# Patient Record
Sex: MALE | Race: WHITE | NOT HISPANIC OR LATINO | Employment: FULL TIME
[De-identification: names, ages, dates, MRNs, and addresses within clinical notes are randomized per-mention and may not be internally consistent; named-entity substitution may affect disease eponyms.]

---

## 2017-03-31 ENCOUNTER — HOSPITAL (OUTPATIENT)
Dept: OTHER | Age: 32
End: 2017-03-31
Attending: INTERNAL MEDICINE

## 2017-03-31 LAB
A/G RATIO_: 0.6
ABS LYMPH: 1.7 K/CUMM (ref 1–3.5)
ABS MONO: 0.6 K/CUMM (ref 0.1–0.8)
ABS NEUTRO: 3.7 K/CUMM (ref 2–8)
ALBUMIN: 2.6 G/DL (ref 3.5–5)
ALCOHOL, ETHYL: <10 MG/DL (ref 0–10)
ALK PHOS: 232 UNIT/L (ref 50–124)
ALT/GPT: 40 UNIT/L (ref 0–55)
ANION GAP SERPL CALC-SCNC: 19 MEQ/L (ref 10–20)
APTT PPP: 28 SECONDS (ref 22–30)
AST/GOT: 186 UNIT/L (ref 5–34)
BASOPHIL: 1 % (ref 0–1)
BILI TOTAL: 21.7 MG/DL (ref 0.2–1)
BUN SERPL-MCNC: 8 MG/DL (ref 6–20)
CALCIUM: 8.4 MG/DL (ref 8.4–10.2)
CHLORIDE: 98 MEQ/L (ref 97–107)
CONTROL NEG: NEGATIVE
CONTROL POS: POSITIVE
CREATININE: 0.6 MG/DL (ref 0.6–1.3)
DIFF_TYPE?: ABNORMAL
EOSINOPHIL: 0 % (ref 0–6)
GLOBULIN_: 4.7 G/DL (ref 2–4.1)
GLUCOSE LVL: 114 MG/DL (ref 70–99)
HCT VFR BLD CALC: 29 % (ref 36–51)
HCT VFR BLD CALC: 35 % (ref 36–51)
HEMOLYSIS 2+: ABNORMAL
HEMOLYSIS 2+: ABNORMAL
HEMOLYSIS 3+: ABNORMAL
HEMOLYSIS 4+: NEGATIVE
HEMOLYSIS 4+: NEGATIVE
HEMOLYSIS 4+: NORMAL
HGB BLD-MCNC: 12 G/DL (ref 12–17)
HGB BLD-MCNC: 9.9 G/DL (ref 12–17)
ICTERIC 4+: ABNORMAL
ICTERIC 4+: NORMAL
IMMATURE GRAN: 0.3 % (ref 0–0.3)
INR: 1.8 (ref 0.9–1.1)
INSTR WBC: 6 K/CUMM (ref 4–11)
LACTIC ACID: 1.2 MMOL/L (ref 0.5–2.2)
LACTIC ACID: 4.2 MMOL/L (ref 0.5–2.2)
LIPASE LEVEL: 18 UNIT/L (ref 8–78)
LIPEMIC 1+: NEGATIVE
LIPEMIC 1+: NEGATIVE
LIPEMIC 3+: NEGATIVE
LIPEMIC 4+: NEGATIVE
LYMPHOCYTE: 28 %
MAGNESIUM LEVEL: 1.2 MG/DL (ref 1.6–2.6)
MCH RBC QN AUTO: 34 PG (ref 25–35)
MCHC RBC AUTO-ENTMCNC: 34 G/DL (ref 32–37)
MCV RBC AUTO: 101 FL (ref 78–97)
MONOCYTE: 10 %
NEUTROPHIL: 61 %
NRBC BLD MANUAL-RTO: 0 % (ref 0–0.2)
OCCULT BLD STL: POSITIVE
PHOSPHORUS: 2.1 MG/DL (ref 2.3–4.7)
PLATELET: 70 K/CUMM (ref 150–450)
POTASSIUM: 4 MEQ/L (ref 3.5–5.1)
PROTHROMBIN TIME: 18.8 SECONDS (ref 9.7–11.6)
RBC # BLD: 3.48 M/CUMM (ref 4.2–6)
RDW: 18.2 % (ref 11.5–14.5)
SODIUM: 136 MEQ/L (ref 136–145)
TCO2: 23 MEQ/L (ref 19–29)
TOTAL PROTEIN: 7.3 G/DL (ref 6.4–8.3)
TROPONIN I: 0.01 NG/ML
WBC # BLD: 6 K/CUMM (ref 4–11)

## 2017-04-01 LAB
A/G RATIO_: 0.6
ABS LYMPH: 1.1 K/CUMM (ref 1–3.5)
ABS MONO: 0.2 K/CUMM (ref 0.1–0.8)
ABS NEUTRO: 1.8 K/CUMM (ref 2–8)
ALBUMIN: 2.2 G/DL (ref 3.5–5)
ALK PHOS: 164 UNIT/L (ref 50–124)
ALT/GPT: 31 UNIT/L (ref 0–55)
AMMONIA LVL: 68 UMOL/L (ref 18–72)
ANION GAP SERPL CALC-SCNC: 17 MEQ/L (ref 10–20)
AST/GOT: 130 UNIT/L (ref 5–34)
BASOPHIL: 1 % (ref 0–1)
BILI TOTAL: 19.9 MG/DL (ref 0.2–1)
BUN SERPL-MCNC: 7 MG/DL (ref 6–20)
CALCIUM: 7.3 MG/DL (ref 8.4–10.2)
CHLORIDE: 108 MEQ/L (ref 97–107)
CREATININE: 0.65 MG/DL (ref 0.6–1.3)
DIFF_TYPE?: ABNORMAL
EOSINOPHIL: 2 % (ref 0–6)
GLOBULIN_: 3.5 G/DL (ref 2–4.1)
GLUCOSE LVL: 73 MG/DL (ref 70–99)
HCT VFR BLD CALC: 29 % (ref 36–51)
HEMOLYSIS 1+: NEGATIVE
HEMOLYSIS 2+: NEGATIVE
HEMOLYSIS 2+: NEGATIVE
HEMOLYSIS 3+: NEGATIVE
HGB BLD-MCNC: 9.7 G/DL (ref 12–17)
IMMATURE GRAN: 0.3 % (ref 0–0.3)
INR: 1.9 (ref 0.9–1.1)
INSTR WBC: 3.2 K/CUMM (ref 4–11)
LIPEMIC 3+: NEGATIVE
LIPEMIC 4+: NEGATIVE
LYMPHOCYTE: 33 %
MAGNESIUM LEVEL: 1.3 MG/DL (ref 1.6–2.6)
MCH RBC QN AUTO: 35 PG (ref 25–35)
MCHC RBC AUTO-ENTMCNC: 33 G/DL (ref 32–37)
MCV RBC AUTO: 104 FL (ref 78–97)
MONOCYTE: 8 %
NEUTROPHIL: 55 %
NRBC BLD MANUAL-RTO: 0 % (ref 0–0.2)
PHOSPHORUS: 3.1 MG/DL (ref 2.3–4.7)
PLATELET: 65 K/CUMM (ref 150–450)
PLT ESTIMATE: ABNORMAL
POTASSIUM: 3.8 MEQ/L (ref 3.5–5.1)
PROTHROMBIN TIME: 19.3 SECONDS (ref 9.7–11.6)
RBC # BLD: 2.79 M/CUMM (ref 4.2–6)
RBC MORPH2: ABNORMAL
RBC MORPH3: ABNORMAL
RBC MORPH: ABNORMAL
RDW: 18.6 % (ref 11.5–14.5)
SODIUM: 141 MEQ/L (ref 136–145)
TCO2: 20 MEQ/L (ref 19–29)
TOTAL PROTEIN: 5.7 G/DL (ref 6.4–8.3)
WBC # BLD: 3.2 K/CUMM (ref 4–11)

## 2017-04-02 LAB
A/G RATIO_: 0.6
ABG GLU: 109 MG/G (ref 65–95)
ABG HCT: 31 % (ref 37–50)
ABG ION CALCIUM: 4.2 MG/DL (ref 4.5–5.3)
ABG K: 3.76 MMOL/L (ref 3.5–5.3)
ABG NA: 134.8 MMOL/L (ref 135–148)
ABS LYMPH: 1 K/CUMM (ref 1–3.5)
ABS MONO: 0.2 K/CUMM (ref 0.1–0.8)
ABS NEUTRO: 1.8 K/CUMM (ref 2–8)
ALBUMIN: 2 G/DL (ref 3.5–5)
ALK PHOS: 153 UNIT/L (ref 50–124)
ALLEN'S TEST: ABNORMAL
ALT/GPT: 30 UNIT/L (ref 0–55)
AMMONIA LVL: 74 UMOL/L (ref 18–72)
AMMONIA LVL: 75 UMOL/L (ref 18–72)
ANALYZER: ABNORMAL
ANION GAP SERPL CALC-SCNC: 16 MEQ/L (ref 10–20)
AST/GOT: 119 UNIT/L (ref 5–34)
BASOPHIL: 1 % (ref 0–1)
BEVT: -4.5 MMOL/L (ref -2–3)
BILI TOTAL: 23.5 MG/DL (ref 0.2–1)
BUN SERPL-MCNC: 11 MG/DL (ref 6–20)
CALCIUM: 7.1 MG/DL (ref 8.4–10.2)
CHLORIDE: 107 MEQ/L (ref 97–107)
COHB: 1.9 % (ref 0.5–1.5)
CREATININE: 0.72 MG/DL (ref 0.6–1.3)
DIFF_TYPE?: ABNORMAL
DRAW SITE: ABNORMAL
EOSINOPHIL: 2 % (ref 0–6)
GLOBULIN_: 3.4 G/DL (ref 2–4.1)
GLUCOSE LVL: 88 MG/DL (ref 70–99)
HCO3: 18.8 MMOL/L (ref 22–26)
HCT VFR BLD CALC: 30 % (ref 36–51)
HEMOLYSIS 1+: NEGATIVE
HEMOLYSIS 1+: NEGATIVE
HEMOLYSIS 2+: NEGATIVE
HEMOLYSIS 2+: NEGATIVE
HEMOLYSIS 3+: NEGATIVE
HGB BLD-MCNC: 9.9 G/DL (ref 12–17)
IMMATURE GRAN: 0.3 % (ref 0–0.3)
INR: 1.9 (ref 0.9–1.1)
INSTR WBC: 3.1 K/CUMM (ref 4–11)
LIPEMIC 3+: NEGATIVE
LIPEMIC 4+: NEGATIVE
LYMPHOCYTE: 30 %
MAGNESIUM LEVEL: 1.4 MG/DL (ref 1.6–2.6)
MCH RBC QN AUTO: 35 PG (ref 25–35)
MCHC RBC AUTO-ENTMCNC: 33 G/DL (ref 32–37)
MCV RBC AUTO: 108 FL (ref 78–97)
METHB: 0.2 % (ref 0–1.5)
MODE: ABNORMAL
MONOCYTE: 8 %
NEUTROPHIL: 58 %
NRBC BLD MANUAL-RTO: 0 % (ref 0–0.2)
O2 SAT(EST): 94.6 %
O2HB: 92.6 % (ref 94–97)
PCO2: 28.9 MMHG (ref 35–45)
PH: 7.43 (ref 7.35–7.45)
PHOSPHORUS: 2.3 MG/DL (ref 2.3–4.7)
PLATELET: 71 K/CUMM (ref 150–450)
PO2: 72.1 MMHG (ref 75–100)
POTASSIUM: 3.8 MEQ/L (ref 3.5–5.1)
PROTHROMBIN TIME: 19.4 SECONDS (ref 9.7–11.6)
RBC # BLD: 2.82 M/CUMM (ref 4.2–6)
RDW: 17.5 % (ref 11.5–14.5)
REPORT STATUS: 2 L/MIN
SAMPLE TYPE: ABNORMAL
SODIUM: 136 MEQ/L (ref 136–145)
TCO2: 17 MEQ/L (ref 19–29)
TECH ID: 197
THB: 10.6 G/DL (ref 12–18)
TOTAL PROTEIN: 5.4 G/DL (ref 6.4–8.3)
WBC # BLD: 3.1 K/CUMM (ref 4–11)

## 2017-04-03 LAB
A/G RATIO_: 0.6
ABG GLU: 118 MG/G (ref 65–95)
ABG GLU: 127 MG/G (ref 65–95)
ABG HCT: 31 % (ref 37–50)
ABG HCT: 32 % (ref 37–50)
ABG ION CALCIUM: 4.1 MG/DL (ref 4.5–5.3)
ABG ION CALCIUM: 4.2 MG/DL (ref 4.5–5.3)
ABG K: 3.54 MMOL/L (ref 3.5–5.3)
ABG K: 3.68 MMOL/L (ref 3.5–5.3)
ABG NA: 133.7 MMOL/L (ref 135–148)
ABG NA: 134.7 MMOL/L (ref 135–148)
ABS LYMPH: 0.8 K/CUMM (ref 1–3.5)
ABS MONO: 0.5 K/CUMM (ref 0.1–0.8)
ABS NEUTRO: 3.3 K/CUMM (ref 2–8)
ALBUMIN: 2 G/DL (ref 3.5–5)
ALK PHOS: 148 UNIT/L (ref 50–124)
ALLEN'S TEST: POSITIVE
ALLEN'S TEST: POSITIVE
ALT/GPT: 32 UNIT/L (ref 0–55)
AMMONIA LVL: 61 UMOL/L (ref 18–72)
ANALYZER: ABNORMAL
ANALYZER: ABNORMAL
ANION GAP SERPL CALC-SCNC: 16 MEQ/L (ref 10–20)
AST/GOT: 119 UNIT/L (ref 5–34)
BASOPHIL: 1 % (ref 0–1)
BEVT: -5.5 MMOL/L (ref -2–3)
BEVT: -5.6 MMOL/L (ref -2–3)
BILI TOTAL: 27 MG/DL (ref 0.2–1)
BUN SERPL-MCNC: 11 MG/DL (ref 6–20)
CALCIUM: 7.3 MG/DL (ref 8.4–10.2)
CHLORIDE: 108 MEQ/L (ref 97–107)
COHB: 1.2 % (ref 0.5–1.5)
COHB: 1.4 % (ref 0.5–1.5)
CREATININE: 0.75 MG/DL (ref 0.6–1.3)
DIFF_TYPE?: ABNORMAL
DRAW SITE: ABNORMAL
DRAW SITE: ABNORMAL
EOSINOPHIL: 1 % (ref 0–6)
GLOBULIN_: 3.3 G/DL (ref 2–4.1)
GLUCOSE LVL: 122 MG/DL (ref 70–99)
HCO3: 18.2 MMOL/L (ref 22–26)
HCO3: 19 MMOL/L (ref 22–26)
HCT VFR BLD CALC: 31 % (ref 36–51)
HEMOLYSIS 1+: NEGATIVE
HEMOLYSIS 2+: NEGATIVE
HEMOLYSIS 2+: NEGATIVE
HEMOLYSIS 3+: NEGATIVE
HGB BLD-MCNC: 10.2 G/DL (ref 12–17)
IMMATURE GRAN: 0.6 % (ref 0–0.3)
INHALED O2 CONCENTRATION: 60 %
INR: 1.7 (ref 0.9–1.1)
INSTR WBC: 4.8 K/CUMM (ref 4–11)
LIPEMIC 3+: NEGATIVE
LIPEMIC 4+: NEGATIVE
LYMPHOCYTE: 18 %
MAGNESIUM LEVEL: 1.5 MG/DL (ref 1.6–2.6)
MCH RBC QN AUTO: 36 PG (ref 25–35)
MCHC RBC AUTO-ENTMCNC: 33 G/DL (ref 32–37)
MCV RBC AUTO: 108 FL (ref 78–97)
METHB: 0.2 % (ref 0–1.5)
METHB: 0.3 % (ref 0–1.5)
MODE: ABNORMAL
MODE: ABNORMAL
MONOCYTE: 10 %
NEUTROPHIL: 70 %
NRBC BLD MANUAL-RTO: 0 % (ref 0–0.2)
O2 SAT(EST): 92.1 %
O2 SAT(EST): 96.6 %
O2HB: 90.6 % (ref 94–97)
O2HB: 95.2 % (ref 94–97)
PATIENT RESP RATE: 20 BR/MIN
PCO2: 29.7 MMHG (ref 35–45)
PCO2: 33.9 MMHG (ref 35–45)
PEEP: 5 CMH20
PH: 7.37 (ref 7.35–7.45)
PH: 7.41 (ref 7.35–7.45)
PHOSPHORUS: 2 MG/DL (ref 2.3–4.7)
PLATELET: 85 K/CUMM (ref 150–450)
PLT ESTIMATE: ABNORMAL
PO2: 65.8 MMHG (ref 75–100)
PO2: 92.7 MMHG (ref 75–100)
POTASSIUM: 3.8 MEQ/L (ref 3.5–5.1)
PROTHROMBIN TIME: 18 SECONDS (ref 9.7–11.6)
RBC # BLD: 2.87 M/CUMM (ref 4.2–6)
RBC MORPH2: ABNORMAL
RBC MORPH3: ABNORMAL
RBC MORPH: ABNORMAL
RDW: 17.6 % (ref 11.5–14.5)
REPORT STATUS: 3 L/MIN
SAMPLE TYPE: ABNORMAL
SAMPLE TYPE: ABNORMAL
SET RESP RATE: 20 BR/MIN
SODIUM: 136 MEQ/L (ref 136–145)
TCO2: 16 MEQ/L (ref 19–29)
TECH ID: 2897
TECH ID: 2897
THB: 10.5 G/DL (ref 12–18)
THB: 10.9 G/DL (ref 12–18)
TIDAL VOLUME: 550 ML
TOTAL PROTEIN: 5.3 G/DL (ref 6.4–8.3)
U AMPH SCRN: NEGATIVE
U BARB SCRN: NEGATIVE
U BENZODIA SCRN: POSITIVE
U COCAINE SCRN: NEGATIVE
U OPIATE SCRN: NEGATIVE
U PCP SCRN: NEGATIVE
U THC SCRN: NEGATIVE
UA APPEAR: ABNORMAL
UA BACTERIA: ABNORMAL
UA BILI: ABNORMAL
UA BLOOD: NEGATIVE
UA COLOR: ABNORMAL
UA EPITHELIAL: ABNORMAL
UA GLUCOSE: NEGATIVE
UA ICTOTEST: ABNORMAL
UA KETONES: ABNORMAL
UA LEUK EST: NEGATIVE
UA NITRITE: NEGATIVE
UA PH: 6 (ref 5–7)
UA PROTEIN: ABNORMAL
UA RBC: ABNORMAL
UA SPEC GRAV: 1.02 (ref 1.01–1.02)
UA UROBILINOGEN: 1 MG/DL (ref 0.2–1)
UA WBC: ABNORMAL
VENTILATOR_MODE: ABNORMAL
WBC # BLD: 4.8 K/CUMM (ref 4–11)
WBC MORPH: ABNORMAL

## 2017-04-04 LAB
A/G RATIO_: 0.5
ABG GLU: 115 MG/G (ref 65–95)
ABG HCT: 36 % (ref 37–50)
ABG ION CALCIUM: 4.3 MG/DL (ref 4.5–5.3)
ABG K: 3.27 MMOL/L (ref 3.5–5.3)
ABG NA: 133.2 MMOL/L (ref 135–148)
ABS LYMPH: 1.7 K/CUMM (ref 1–3.5)
ABS MONO: 1.2 K/CUMM (ref 0.1–0.8)
ABS NEUTRO: 6.4 K/CUMM (ref 2–8)
ALBUMIN: 1.8 G/DL (ref 3.5–5)
ALK PHOS: 131 UNIT/L (ref 50–124)
ALLEN'S TEST: ABNORMAL
ALT/GPT: 32 UNIT/L (ref 0–55)
ANALYZER: ABNORMAL
ANION GAP SERPL CALC-SCNC: 16 MEQ/L (ref 10–20)
AST/GOT: 97 UNIT/L (ref 5–34)
BASOPHIL: 1 % (ref 0–1)
BEVT: -5.6 MMOL/L (ref -2–3)
BILI TOTAL: 24.8 MG/DL (ref 0.2–1)
BUN SERPL-MCNC: 14 MG/DL (ref 6–20)
CALCIUM: 7.1 MG/DL (ref 8.4–10.2)
CHLORIDE: 108 MEQ/L (ref 97–107)
COHB: 0.4 % (ref 0.5–1.5)
CREATININE: 1.11 MG/DL (ref 0.6–1.3)
DIFF_TYPE?: ABNORMAL
DRAW SITE: ABNORMAL
EOSINOPHIL: 1 % (ref 0–6)
GLOBULIN_: 3.3 G/DL (ref 2–4.1)
GLUCOSE LVL: 114 MG/DL (ref 70–99)
HCO3: 17.1 MMOL/L (ref 22–26)
HCT VFR BLD CALC: 34 % (ref 36–51)
HEMOLYSIS 2+: ABNORMAL
HEMOLYSIS 2+: NORMAL
HEMOLYSIS 3+: NORMAL
HGB BLD-MCNC: 11.4 G/DL (ref 12–17)
ICTERIC 4+: NORMAL
IMMATURE GRAN: 1.4 % (ref 0–0.3)
INHALED O2 CONCENTRATION: 60 %
INSTR WBC: 9.7 K/CUMM (ref 4–11)
LIPASE LEVEL: 24 UNIT/L (ref 8–78)
LIPEMIC 3+: NEGATIVE
LIPEMIC 4+: NEGATIVE
LYMPHOCYTE: 18 %
MAGNESIUM LEVEL: 1.9 MG/DL (ref 1.6–2.6)
MCH RBC QN AUTO: 36 PG (ref 25–35)
MCHC RBC AUTO-ENTMCNC: 34 G/DL (ref 32–37)
MCV RBC AUTO: 108 FL (ref 78–97)
METHB: 0 % (ref 0–1.5)
MODE: ABNORMAL
MONOCYTE: 13 %
NEUTROPHIL: 66 %
NRBC BLD MANUAL-RTO: 0 % (ref 0–0.2)
O2 SAT(EST): 98.7 %
O2HB: 98.3 % (ref 94–97)
PATIENT RESP RATE: 20 BR/MIN
PCO2: 26 MMHG (ref 35–45)
PEEP: 5 CMH20
PH: 7.44 (ref 7.35–7.45)
PHOSPHORUS: 2.5 MG/DL (ref 2.3–4.7)
PLATELET: 106 K/CUMM (ref 150–450)
PO2: 148.5 MMHG (ref 75–100)
POTASSIUM: 3.5 MEQ/L (ref 3.5–5.1)
RBC # BLD: 3.14 M/CUMM (ref 4.2–6)
RDW: 18.6 % (ref 11.5–14.5)
SAMPLE TYPE: ABNORMAL
SET RESP RATE: 20 BR/MIN
SODIUM: 136 MEQ/L (ref 136–145)
TCO2: 16 MEQ/L (ref 19–29)
TECH ID: 575
THB: 12.3 G/DL (ref 12–18)
TIDAL VOLUME: 550 ML
TOTAL PROTEIN: 5.1 G/DL (ref 6.4–8.3)
TRIGL SERPL-MCNC: 323 MG/DL
VENTILATOR_MODE: ABNORMAL
WBC # BLD: 9.7 K/CUMM (ref 4–11)

## 2017-04-05 LAB
A/G RATIO_: 0.8
ABG GLU: 143 MG/G (ref 65–95)
ABG HCT: 31 % (ref 37–50)
ABG ION CALCIUM: 4.1 MG/DL (ref 4.5–5.3)
ABG K: 2.95 MMOL/L (ref 3.5–5.3)
ABG NA: 137 MMOL/L (ref 135–148)
ABS LYMPH: 1 K/CUMM (ref 1–3.5)
ABS MONO: 0.6 K/CUMM (ref 0.1–0.8)
ABS NEUTRO: 2.4 K/CUMM (ref 2–8)
ALBUMIN: 2.3 G/DL (ref 3.5–5)
ALK PHOS: 123 UNIT/L (ref 50–124)
ALLEN'S TEST: POSITIVE
ALT/GPT: 27 UNIT/L (ref 0–55)
AMMONIA LVL: 59 UMOL/L (ref 18–72)
ANALYZER: ABNORMAL
ANION GAP SERPL CALC-SCNC: 15 MEQ/L (ref 10–20)
AST/GOT: 68 UNIT/L (ref 5–34)
BASOPHIL: 1 % (ref 0–1)
BEVT: -4.4 MMOL/L (ref -2–3)
BILI TOTAL: 23.5 MG/DL (ref 0.2–1)
BUN SERPL-MCNC: 10 MG/DL (ref 6–20)
CALCIUM: 7.3 MG/DL (ref 8.4–10.2)
CHLORIDE: 107 MEQ/L (ref 97–107)
COHB: 0 % (ref 0.5–1.5)
CREATININE: 0.86 MG/DL (ref 0.6–1.3)
DEVICE SN: ABNORMAL
DIFF_TYPE?: ABNORMAL
DRAW SITE: ABNORMAL
EOSINOPHIL: 1 % (ref 0–6)
GLOBULIN_: 3 G/DL (ref 2–4.1)
GLUCOSE LVL: 133 MG/DL (ref 70–99)
HCO3: 18.4 MMOL/L (ref 22–26)
HCT VFR BLD CALC: 29 % (ref 36–51)
HCT VFR BLD CALC: 30 % (ref 36–51)
HEMOLYSIS 1+: NEGATIVE
HEMOLYSIS 2+: NEGATIVE
HGB BLD-MCNC: 10.1 G/DL (ref 12–17)
HGB BLD-MCNC: 9.9 G/DL (ref 12–17)
IMMATURE GRAN: 2.6 % (ref 0–0.3)
INHALED O2 CONCENTRATION: 40 %
INR: 1.7 (ref 0.9–1.1)
INSTR WBC: 4.2 K/CUMM (ref 4–11)
LIPEMIC 3+: NEGATIVE
LYMPHOCYTE: 24 %
MCH RBC QN AUTO: 36 PG (ref 25–35)
MCHC RBC AUTO-ENTMCNC: 34 G/DL (ref 32–37)
MCV RBC AUTO: 107 FL (ref 78–97)
METHB: 0.3 % (ref 0–1.5)
MODE: ABNORMAL
MONOCYTE: 14 %
NEUTROPHIL: 58 %
NRBC BLD MANUAL-RTO: 0 % (ref 0–0.2)
O2 SAT(EST): 96.2 %
O2HB: 95.9 % (ref 94–97)
PATIENT RESP RATE: 17 BR/MIN
PCO2: 26.7 MMHG (ref 35–45)
PEEP: 5 CMH20
PH: 7.46 (ref 7.35–7.45)
PLATELET: 73 K/CUMM (ref 150–450)
PO2: 89.5 MMHG (ref 75–100)
POC_GLU: 130 MG/DL (ref 70–99)
POC_GLU: 130 MG/DL (ref 70–99)
POC_GLU: 135 MG/DL (ref 70–99)
POC_GLU: 154 MG/DL (ref 70–99)
POTASSIUM: 3 MEQ/L (ref 3.5–5.1)
PROTHROMBIN TIME: 18 SECONDS (ref 9.7–11.6)
RBC # BLD: 2.73 M/CUMM (ref 4.2–6)
RDW: 18.3 % (ref 11.5–14.5)
SAMPLE TYPE: ABNORMAL
SET RESP RATE: 16 BR/MIN
SODIUM: 136 MEQ/L (ref 136–145)
TCO2: 17 MEQ/L (ref 19–29)
TECH ID: 4631
TECH_ID: ABNORMAL
THB: 10.7 G/DL (ref 12–18)
TIDAL VOLUME: 550 ML
TOTAL PROTEIN: 5.3 G/DL (ref 6.4–8.3)
VENTILATOR_MODE: ABNORMAL
WBC # BLD: 4.2 K/CUMM (ref 4–11)

## 2017-04-06 LAB
A/G RATIO_: 0.7
ABG GLU: 128 MG/G (ref 65–95)
ABG HCT: 34 % (ref 37–50)
ABG ION CALCIUM: 4.4 MG/DL (ref 4.5–5.3)
ABG K: 3.11 MMOL/L (ref 3.5–5.3)
ABG NA: 138.5 MMOL/L (ref 135–148)
ABS LYMPH: 1.1 K/CUMM (ref 1–3.5)
ABS MONO: 0.5 K/CUMM (ref 0.1–0.8)
ABS NEUTRO: 2.4 K/CUMM (ref 2–8)
ALBUMIN: 2.2 G/DL (ref 3.5–5)
ALK PHOS: 125 UNIT/L (ref 50–124)
ALLEN'S TEST: POSITIVE
ALT/GPT: 26 UNIT/L (ref 0–55)
ANALYZER: ABNORMAL
ANION GAP SERPL CALC-SCNC: 14 MEQ/L (ref 10–20)
AST/GOT: 63 UNIT/L (ref 5–34)
BASOPHIL: 1 % (ref 0–1)
BEVT: -2.5 MMOL/L (ref -2–3)
BILI TOTAL: 19.1 MG/DL (ref 0.2–1)
BUN SERPL-MCNC: 7 MG/DL (ref 6–20)
CALCIUM: 7.6 MG/DL (ref 8.4–10.2)
CHLORIDE: 111 MEQ/L (ref 97–107)
COHB: 0.7 % (ref 0.5–1.5)
CREATININE: 0.8 MG/DL (ref 0.6–1.3)
DEVICE SN: ABNORMAL
DIFF_TYPE?: ABNORMAL
DRAW SITE: ABNORMAL
EOSINOPHIL: 1 % (ref 0–6)
GLOBULIN_: 3.2 G/DL (ref 2–4.1)
GLUCOSE LVL: 130 MG/DL (ref 70–99)
HCO3: 20.8 MMOL/L (ref 22–26)
HCT VFR BLD CALC: 32 % (ref 36–51)
HEMOLYSIS 2+: NEGATIVE
HGB BLD-MCNC: 10.5 G/DL (ref 12–17)
IMMATURE GRAN: 0.5 % (ref 0–0.3)
INHALED O2 CONCENTRATION: 40 %
INSTR WBC: 4.2 K/CUMM (ref 4–11)
LIPEMIC 3+: NEGATIVE
LYMPHOCYTE: 26 %
MCH RBC QN AUTO: 36 PG (ref 25–35)
MCHC RBC AUTO-ENTMCNC: 33 G/DL (ref 32–37)
MCV RBC AUTO: 107 FL (ref 78–97)
METHB: 0.3 % (ref 0–1.5)
MODE: ABNORMAL
MONOCYTE: 13 %
NEUTROPHIL: 59 %
NRBC BLD MANUAL-RTO: 0 % (ref 0–0.2)
O2 SAT(EST): 94.4 %
O2HB: 93.5 % (ref 94–97)
PATIENT RESP RATE: 20 BR/MIN
PCO2: 31.3 MMHG (ref 35–45)
PEEP: 5 CMH20
PH: 7.44 (ref 7.35–7.45)
PLATELET: 85 K/CUMM (ref 150–450)
PO2: 71.4 MMHG (ref 75–100)
POC_GLU: 130 MG/DL (ref 70–99)
POTASSIUM: 3.2 MEQ/L (ref 3.5–5.1)
RBC # BLD: 2.96 M/CUMM (ref 4.2–6)
RDW: 17.9 % (ref 11.5–14.5)
SAMPLE TYPE: ABNORMAL
SET RESP RATE: 16 BR/MIN
SODIUM: 139 MEQ/L (ref 136–145)
TCO2: 17 MEQ/L (ref 19–29)
TECH ID: 4631
TECH_ID: ABNORMAL
THB: 11.6 G/DL (ref 12–18)
TIDAL VOLUME: 550 ML
TOTAL PROTEIN: 5.4 G/DL (ref 6.4–8.3)
TRIGL SERPL-MCNC: 307 MG/DL
VENTILATOR_MODE: ABNORMAL
WBC # BLD: 4.2 K/CUMM (ref 4–11)

## 2017-04-07 LAB
A/G RATIO_: 0.6
ABG GLU: 138 MG/G (ref 65–95)
ABG HCT: 34 % (ref 37–50)
ABG ION CALCIUM: 4.4 MG/DL (ref 4.5–5.3)
ABG K: 3.16 MMOL/L (ref 3.5–5.3)
ABG NA: 138.7 MMOL/L (ref 135–148)
ABS LYMPH: 1.1 K/CUMM (ref 1–3.5)
ABS MONO: 0.5 K/CUMM (ref 0.1–0.8)
ABS NEUTRO: 2.4 K/CUMM (ref 2–8)
ALBUMIN: 2.1 G/DL (ref 3.5–5)
ALK PHOS: 133 UNIT/L (ref 50–124)
ALLEN'S TEST: ABNORMAL
ALT/GPT: 28 UNIT/L (ref 0–55)
ANALYZER: ABNORMAL
ANION GAP SERPL CALC-SCNC: 13 MEQ/L (ref 10–20)
AST/GOT: 71 UNIT/L (ref 5–34)
BASOPHIL: 1 % (ref 0–1)
BEVT: -1.7 MMOL/L (ref -2–3)
BILI TOTAL: 16.3 MG/DL (ref 0.2–1)
BUN SERPL-MCNC: 8 MG/DL (ref 6–20)
C DIFF COMMENT: ABNORMAL
C DIFF TOXIN PCR: POSITIVE
CALCIUM: 7.8 MG/DL (ref 8.4–10.2)
CHLORIDE: 110 MEQ/L (ref 97–107)
COHB: 0.1 % (ref 0.5–1.5)
CREATININE: 0.73 MG/DL (ref 0.6–1.3)
DIFF_TYPE?: ABNORMAL
DRAW SITE: ABNORMAL
EOSINOPHIL: 1 % (ref 0–6)
GLOBULIN_: 3.7 G/DL (ref 2–4.1)
GLUCOSE LVL: 142 MG/DL (ref 70–99)
GROSS: ABNORMAL
HCO3: 20.4 MMOL/L (ref 22–26)
HCT VFR BLD CALC: 31 % (ref 36–51)
HEMOLYSIS 2+: ABNORMAL
HGB BLD-MCNC: 10.3 G/DL (ref 12–17)
IMMATURE GRAN: 1 % (ref 0–0.3)
INHALED O2 CONCENTRATION: 40 %
INSTR WBC: 4.1 K/CUMM (ref 4–11)
LIPEMIC 3+: NEGATIVE
LYMPHOCYTE: 27 %
MCH RBC QN AUTO: 35 PG (ref 25–35)
MCHC RBC AUTO-ENTMCNC: 33 G/DL (ref 32–37)
MCV RBC AUTO: 106 FL (ref 78–97)
METHB: 0.3 % (ref 0–1.5)
MODE: ABNORMAL
MONOCYTE: 11 %
NEUTROPHIL: 58 %
NRBC BLD MANUAL-RTO: 0 % (ref 0–0.2)
O2 SAT(EST): 94.5 %
O2HB: 94.1 % (ref 94–97)
PATIENT RESP RATE: 20 BR/MIN
PCO2: 27.1 MMHG (ref 35–45)
PEEP: 5 CMH20
PH: 7.5 (ref 7.35–7.45)
PLATELET: 96 K/CUMM (ref 150–450)
PO2: 73.2 MMHG (ref 75–100)
POTASSIUM: 5 MEQ/L (ref 3.5–5.1)
PROCALCITONIN LVL: 0.46
RBC # BLD: 2.92 M/CUMM (ref 4.2–6)
RDW: 17.6 % (ref 11.5–14.5)
SAMPLE TYPE: ABNORMAL
SET RESP RATE: 16 BR/MIN
SODIUM: 137 MEQ/L (ref 136–145)
TCO2: 19 MEQ/L (ref 19–29)
TECH ID: 1079
THB: 11.6 G/DL (ref 12–18)
TIDAL VOLUME: 550 ML
TOTAL PROTEIN: 5.8 G/DL (ref 6.4–8.3)
VENTILATOR_MODE: ABNORMAL
WBC # BLD: 4.1 K/CUMM (ref 4–11)

## 2017-04-08 LAB
ABG GLU: 134 MG/G (ref 65–95)
ABG HCT: 32 % (ref 37–50)
ABG ION CALCIUM: 4.5 MG/DL (ref 4.5–5.3)
ABG K: 3.14 MMOL/L (ref 3.5–5.3)
ABG NA: 139.3 MMOL/L (ref 135–148)
ABS LYMPH: 1.2 K/CUMM (ref 1–3.5)
ABS MONO: 0.6 K/CUMM (ref 0.1–0.8)
ABS NEUTRO: 3.3 K/CUMM (ref 2–8)
ALLEN'S TEST: POSITIVE
AMMONIA LVL: 52 UMOL/L (ref 18–72)
ANALYZER: ABNORMAL
ANION GAP SERPL CALC-SCNC: 12 MEQ/L (ref 10–20)
BASOPHIL: 1 % (ref 0–1)
BEVT: -1.2 MMOL/L (ref -2–3)
BUN SERPL-MCNC: 7 MG/DL (ref 6–20)
CALCIUM: 6.7 MG/DL (ref 8.4–10.2)
CHLORIDE: 116 MEQ/L (ref 97–107)
COHB: 0.7 % (ref 0.5–1.5)
CREATININE: 0.62 MG/DL (ref 0.6–1.3)
DIFF_TYPE?: ABNORMAL
DRAW SITE: ABNORMAL
EOSINOPHIL: 1 % (ref 0–6)
GLUCOSE LVL: 111 MG/DL (ref 70–99)
HCO3: 21.5 MMOL/L (ref 22–26)
HCT VFR BLD CALC: 31 % (ref 36–51)
HEMOLYSIS 1+: NEGATIVE
HEMOLYSIS 2+: NEGATIVE
HEMOLYSIS 3+: NEGATIVE
HGB BLD-MCNC: 9.9 G/DL (ref 12–17)
IMMATURE GRAN: 0.9 % (ref 0–0.3)
INHALED O2 CONCENTRATION: 40 %
INSTR WBC: 5.4 K/CUMM (ref 4–11)
LIPEMIC 4+: NEGATIVE
LYMPHOCYTE: 23 %
MAGNESIUM LEVEL: 1.2 MG/DL (ref 1.6–2.6)
MCH RBC QN AUTO: 34 PG (ref 25–35)
MCHC RBC AUTO-ENTMCNC: 32 G/DL (ref 32–37)
MCV RBC AUTO: 107 FL (ref 78–97)
METHB: 0.3 % (ref 0–1.5)
MODE: ABNORMAL
MONOCYTE: 12 %
NEUTROPHIL: 62 %
NRBC BLD MANUAL-RTO: 0 % (ref 0–0.2)
O2 SAT(EST): 97.1 %
O2HB: 96.1 % (ref 94–97)
PATIENT RESP RATE: 19 BR/MIN
PCO2: 29.5 MMHG (ref 35–45)
PH: 7.48 (ref 7.35–7.45)
PHOSPHORUS: 2.3 MG/DL (ref 2.3–4.7)
PLATELET: 117 K/CUMM (ref 150–450)
PO2: 95.6 MMHG (ref 75–100)
POTASSIUM: 2.6 MEQ/L (ref 3.5–5.1)
POTASSIUM: 3.8 MEQ/L (ref 3.5–5.1)
RBC # BLD: 2.88 M/CUMM (ref 4.2–6)
RDW: 17.4 % (ref 11.5–14.5)
SAMPLE TYPE: ABNORMAL
SET RESP RATE: 12 BR/MIN
SODIUM: 142 MEQ/L (ref 136–145)
TCO2: 17 MEQ/L (ref 19–29)
TECH ID: 2991
THB: 10.9 G/DL (ref 12–18)
TIDAL VOLUME: 550 ML
VENTILATOR_MODE: ABNORMAL
WBC # BLD: 5.4 K/CUMM (ref 4–11)

## 2017-04-09 LAB
A/G RATIO_: 0.5
ABS LYMPH: 1.4 K/CUMM (ref 1–3.5)
ABS MONO: 0.7 K/CUMM (ref 0.1–0.8)
ABS NEUTRO: 4.9 K/CUMM (ref 2–8)
ALBUMIN: 1.9 G/DL (ref 3.5–5)
ALK PHOS: 120 UNIT/L (ref 50–124)
ALT/GPT: 27 UNIT/L (ref 0–55)
ANION GAP SERPL CALC-SCNC: 13 MEQ/L (ref 10–20)
AST/GOT: 64 UNIT/L (ref 5–34)
BASOPHIL: 1 % (ref 0–1)
BILI TOTAL: 15.8 MG/DL (ref 0.2–1)
BUN SERPL-MCNC: 11 MG/DL (ref 6–20)
CALCIUM: 8 MG/DL (ref 8.4–10.2)
CHLORIDE: 112 MEQ/L (ref 97–107)
CREATININE: 0.83 MG/DL (ref 0.6–1.3)
DIFF_TYPE?: ABNORMAL
EOSINOPHIL: 2 % (ref 0–6)
GLOBULIN_: 3.9 G/DL (ref 2–4.1)
GLUCOSE LVL: 174 MG/DL (ref 70–99)
HCT VFR BLD CALC: 33 % (ref 36–51)
HEMOLYSIS 2+: NEGATIVE
HEMOLYSIS 2+: NEGATIVE
HGB BLD-MCNC: 10.7 G/DL (ref 12–17)
IMMATURE GRAN: 1.5 % (ref 0–0.3)
INSTR WBC: 7.3 K/CUMM (ref 4–11)
LIPEMIC 3+: NEGATIVE
LYMPHOCYTE: 19 %
MAGNESIUM LEVEL: 1.9 MG/DL (ref 1.6–2.6)
MCH RBC QN AUTO: 35 PG (ref 25–35)
MCHC RBC AUTO-ENTMCNC: 32 G/DL (ref 32–37)
MCV RBC AUTO: 108 FL (ref 78–97)
MONOCYTE: 9 %
NEUTROPHIL: 67 %
NRBC BLD MANUAL-RTO: 0 % (ref 0–0.2)
PLATELET: 160 K/CUMM (ref 150–450)
POTASSIUM: 3.7 MEQ/L (ref 3.5–5.1)
RBC # BLD: 3.08 M/CUMM (ref 4.2–6)
RDW: 17.2 % (ref 11.5–14.5)
SODIUM: 140 MEQ/L (ref 136–145)
TCO2: 19 MEQ/L (ref 19–29)
TOTAL PROTEIN: 5.8 G/DL (ref 6.4–8.3)
WBC # BLD: 7.3 K/CUMM (ref 4–11)

## 2017-04-10 LAB
A/G RATIO_: 0.5
ABS LYMPH: 1.4 K/CUMM (ref 1–3.5)
ABS MONO: 0.6 K/CUMM (ref 0.1–0.8)
ABS NEUTRO: 4.6 K/CUMM (ref 2–8)
ALBUMIN BF TYPE: NORMAL
ALBUMIN BF: 1 G/DL
ALBUMIN: 1.9 G/DL (ref 3.5–5)
ALK PHOS: 122 UNIT/L (ref 50–124)
ALT/GPT: 26 UNIT/L (ref 0–55)
ANION GAP SERPL CALC-SCNC: 15 MEQ/L (ref 10–20)
APPEAR BF: ABNORMAL
AST/GOT: 73 UNIT/L (ref 5–34)
BASOPHIL: 1 % (ref 0–1)
BF TYPE: ABNORMAL
BILI TOTAL: 13.4 MG/DL (ref 0.2–1)
BUN SERPL-MCNC: 14 MG/DL (ref 6–20)
CALCIUM: 7.8 MG/DL (ref 8.4–10.2)
CHLORIDE: 112 MEQ/L (ref 97–107)
COLOR BF: YELLOW
CREATININE: 0.94 MG/DL (ref 0.6–1.3)
DIFF_TYPE?: ABNORMAL
EOSINOPHIL: 2 % (ref 0–6)
GLOBULIN_: 3.5 G/DL (ref 2–4.1)
GLUC BF TYPE: NORMAL
GLUCOSE BF: 136 MG/DL
GLUCOSE LVL: 146 MG/DL (ref 70–99)
HCT VFR BLD CALC: 30 % (ref 36–51)
HEMOLYSIS 2+: NEGATIVE
HEMOLYSIS 2+: NEGATIVE
HEMOLYSIS 3+: NEGATIVE
HGB BLD-MCNC: 9.6 G/DL (ref 12–17)
IMMATURE GRAN: 0.9 % (ref 0–0.3)
INSTR WBC: 6.9 K/CUMM (ref 4–11)
LDH BF TYPE: NORMAL
LDH BF: 82 UNIT/L
LIPEMIC 3+: NEGATIVE
LIPEMIC 4+: NEGATIVE
LYMPH BF: 31 %
LYMPHOCYTE: 21 %
MACROPHAGE BF: 28 %
MAGNESIUM LEVEL: 1.8 MG/DL (ref 1.6–2.6)
MCH RBC QN AUTO: 34 PG (ref 25–35)
MCHC RBC AUTO-ENTMCNC: 32 G/DL (ref 32–37)
MCV RBC AUTO: 109 FL (ref 78–97)
MESO BF: 30 %
MONOCYTE BF: 2 %
MONOCYTE: 10 %
NEUTROPHIL: 66 %
NRBC BLD MANUAL-RTO: 0 % (ref 0–0.2)
NUCLEATED CELLS: 133 /UL (ref 0–10)
PHOSPHORUS: 2.8 MG/DL (ref 2.3–4.7)
PLATELET: 166 K/CUMM (ref 150–450)
POTASSIUM: 3.9 MEQ/L (ref 3.5–5.1)
PROT BF TYPE: NORMAL
PROTEIN BF: 2.1 G/DL
RBC # BLD: 2.78 M/CUMM (ref 4.2–6)
RBC BF INSTR: 0 /UL
RBC BF: 1000 /UL (ref 0–0)
RDW: 17.2 % (ref 11.5–14.5)
SEGS BF: 9 %
SODIUM: 141 MEQ/L (ref 136–145)
TCO2: 18 MEQ/L (ref 19–29)
TOTAL PROTEIN: 5.4 G/DL (ref 6.4–8.3)
WBC # BLD: 6.9 K/CUMM (ref 4–11)
WBC BF INSTR: 0.13 /UL

## 2017-04-11 LAB
A/G RATIO_: 0.7
ABG GLU: 143 MG/G (ref 65–95)
ABG HCT: 45 % (ref 37–50)
ABG ION CALCIUM: 4.6 MG/DL (ref 4.5–5.3)
ABG K: 3.51 MMOL/L (ref 3.5–5.3)
ABG NA: 141.1 MMOL/L (ref 135–148)
ABS LYMPH: 1 K/CUMM (ref 1–3.5)
ABS MONO: 0.5 K/CUMM (ref 0.1–0.8)
ABS NEUTRO: 5.5 K/CUMM (ref 2–8)
ALBUMIN: 2.2 G/DL (ref 3.5–5)
ALK PHOS: 83 UNIT/L (ref 50–124)
ALLEN'S TEST: POSITIVE
ALT/GPT: 21 UNIT/L (ref 0–55)
AMMONIA LVL: 46 UMOL/L (ref 18–72)
ANALYZER: ABNORMAL
ANION GAP SERPL CALC-SCNC: 15 MEQ/L (ref 10–20)
AST/GOT: 65 UNIT/L (ref 5–34)
BASOPHIL: 1 % (ref 0–1)
BEVT: -1.3 MMOL/L (ref -2–3)
BILI TOTAL: 12.1 MG/DL (ref 0.2–1)
BUN SERPL-MCNC: 13 MG/DL (ref 6–20)
CALCIUM: 7.9 MG/DL (ref 8.4–10.2)
CHLORIDE: 113 MEQ/L (ref 97–107)
COHB: 1 % (ref 0.5–1.5)
CPAP: 5 CMH20
CREATININE: 0.76 MG/DL (ref 0.6–1.3)
DIFF_TYPE?: ABNORMAL
DRAW SITE: ABNORMAL
EOSINOPHIL: 1 % (ref 0–6)
GLOBULIN_: 3 G/DL (ref 2–4.1)
GLUCOSE LVL: 139 MG/DL (ref 70–99)
HCO3: 21.9 MMOL/L (ref 22–26)
HCT VFR BLD CALC: 28 % (ref 36–51)
HEMOLYSIS 1+: NEGATIVE
HEMOLYSIS 2+: NEGATIVE
HGB BLD-MCNC: 8.8 G/DL (ref 12–17)
IMMATURE GRAN: 0.6 % (ref 0–0.3)
INHALED O2 CONCENTRATION: 40 %
INSTR WBC: 7.2 K/CUMM (ref 4–11)
LIPEMIC 3+: NEGATIVE
LYMPHOCYTE: 14 %
MAGNESIUM LEVEL: 1.6 MG/DL (ref 1.6–2.6)
MCH RBC QN AUTO: 35 PG (ref 25–35)
MCHC RBC AUTO-ENTMCNC: 32 G/DL (ref 32–37)
MCV RBC AUTO: 110 FL (ref 78–97)
METHB: 0.3 % (ref 0–1.5)
MODE: ABNORMAL
MONOCYTE: 7 %
NEUTROPHIL: 76 %
NRBC BLD MANUAL-RTO: 0 % (ref 0–0.2)
O2 SAT(EST): 94.3 %
O2HB: 93.1 % (ref 94–97)
PCO2: 32.9 MMHG (ref 35–45)
PH: 7.44 (ref 7.35–7.45)
PLATELET: 136 K/CUMM (ref 150–450)
PO2: 72.2 MMHG (ref 75–100)
POTASSIUM: 3.5 MEQ/L (ref 3.5–5.1)
POTASSIUM: 3.6 MEQ/L (ref 3.5–5.1)
PS: 20 CMH20
RBC # BLD: 2.51 M/CUMM (ref 4.2–6)
RDW: 17.2 % (ref 11.5–14.5)
SAMPLE TYPE: ABNORMAL
SODIUM: 143 MEQ/L (ref 136–145)
TCO2: 19 MEQ/L (ref 19–29)
TECH ID: 6881
THB: 15.3 G/DL (ref 12–18)
TOTAL PROTEIN: 5.2 G/DL (ref 6.4–8.3)
VENTILATOR_MODE: ABNORMAL
WBC # BLD: 7.2 K/CUMM (ref 4–11)

## 2017-04-12 LAB
A/G RATIO_: 0.6
ABG GLU: 127 MG/G (ref 65–95)
ABG HCT: 32 % (ref 37–50)
ABG ION CALCIUM: 4.6 MG/DL (ref 4.5–5.3)
ABG K: 3.76 MMOL/L (ref 3.5–5.3)
ABG NA: 139.3 MMOL/L (ref 135–148)
ABS LYMPH: 1.3 K/CUMM (ref 1–3.5)
ABS MONO: 0.5 K/CUMM (ref 0.1–0.8)
ABS NEUTRO: 5.5 K/CUMM (ref 2–8)
ALBUMIN: 2 G/DL (ref 3.5–5)
ALK PHOS: 93 UNIT/L (ref 50–124)
ALLEN'S TEST: POSITIVE
ALT/GPT: 27 UNIT/L (ref 0–55)
AMMONIA LVL: 45 UMOL/L (ref 18–72)
ANALYZER: ABNORMAL
ANION GAP SERPL CALC-SCNC: 12 MEQ/L (ref 10–20)
AST/GOT: 80 UNIT/L (ref 5–34)
BASOPHIL: 1 % (ref 0–1)
BEVT: -3 MMOL/L (ref -2–3)
BILI TOTAL: 10.8 MG/DL (ref 0.2–1)
BUN SERPL-MCNC: 11 MG/DL (ref 6–20)
CALCIUM: 7.9 MG/DL (ref 8.4–10.2)
CHLORIDE: 115 MEQ/L (ref 97–107)
COHB: 0.4 % (ref 0.5–1.5)
CREATININE: 0.65 MG/DL (ref 0.6–1.3)
DIFF_TYPE?: ABNORMAL
DRAW SITE: ABNORMAL
EOSINOPHIL: 1 % (ref 0–6)
GLOBULIN_: 3.2 G/DL (ref 2–4.1)
GLUCOSE LVL: 123 MG/DL (ref 70–99)
HCO3: 20.1 MMOL/L (ref 22–26)
HCT VFR BLD CALC: 29 % (ref 36–51)
HEMOLYSIS 1+: NORMAL
HEMOLYSIS 2+: ABNORMAL
HEMOLYSIS 2+: NORMAL
HEMOLYSIS 3+: NORMAL
HGB BLD-MCNC: 9.1 G/DL (ref 12–17)
IMMATURE GRAN: 0.7 % (ref 0–0.3)
INHALED O2 CONCENTRATION: 40 %
INSTR WBC: 7.5 K/CUMM (ref 4–11)
LIPEMIC 3+: NEGATIVE
LIPEMIC 4+: NEGATIVE
LYMPHOCYTE: 18 %
MAGNESIUM LEVEL: 1.6 MG/DL (ref 1.6–2.6)
MCH RBC QN AUTO: 35 PG (ref 25–35)
MCHC RBC AUTO-ENTMCNC: 32 G/DL (ref 32–37)
MCV RBC AUTO: 112 FL (ref 78–97)
METHB: 0 % (ref 0–1.5)
MODE: ABNORMAL
MONOCYTE: 6 %
NEUTROPHIL: 73 %
NRBC BLD MANUAL-RTO: 0 % (ref 0–0.2)
O2 SAT(EST): 97.1 %
O2HB: 96.7 % (ref 94–97)
PATIENT RESP RATE: 17 BR/MIN
PCO2: 29.8 MMHG (ref 35–45)
PEEP: 5 CMH20
PH: 7.45 (ref 7.35–7.45)
PHOSPHORUS: 2.8 MG/DL (ref 2.3–4.7)
PLATELET: 118 K/CUMM (ref 150–450)
PO2: 100.4 MMHG (ref 75–100)
POTASSIUM: 4.4 MEQ/L (ref 3.5–5.1)
PS: 20 CMH20
RBC # BLD: 2.57 M/CUMM (ref 4.2–6)
RDW: 17 % (ref 11.5–14.5)
SAMPLE TYPE: ABNORMAL
SODIUM: 142 MEQ/L (ref 136–145)
TCO2: 19 MEQ/L (ref 19–29)
TECH ID: 9545
THB: 10.9 G/DL (ref 12–18)
TOTAL PROTEIN: 5.2 G/DL (ref 6.4–8.3)
VENTILATOR_MODE: ABNORMAL
WBC # BLD: 7.5 K/CUMM (ref 4–11)

## 2017-04-13 LAB
A/G RATIO_: 0.7
ABG GLU: 133 MG/G (ref 65–95)
ABG HCT: 32 % (ref 37–50)
ABG ION CALCIUM: 4.5 MG/DL (ref 4.5–5.3)
ABG K: 3.75 MMOL/L (ref 3.5–5.3)
ABG NA: 140.8 MMOL/L (ref 135–148)
ABS LYMPH: 1.3 K/CUMM (ref 1–3.5)
ABS MONO: 0.4 K/CUMM (ref 0.1–0.8)
ABS NEUTRO: 5.2 K/CUMM (ref 2–8)
ALBUMIN: 2 G/DL (ref 3.5–5)
ALK PHOS: 99 UNIT/L (ref 50–124)
ALLEN'S TEST: POSITIVE
ALT/GPT: 26 UNIT/L (ref 0–55)
ANALYZER: ABNORMAL
ANION GAP SERPL CALC-SCNC: 11 MEQ/L (ref 10–20)
AST/GOT: 83 UNIT/L (ref 5–34)
BASOPHIL: 1 % (ref 0–1)
BEVT: -2 MMOL/L (ref -2–3)
BILI TOTAL: 9.7 MG/DL (ref 0.2–1)
BUN SERPL-MCNC: 13 MG/DL (ref 6–20)
CALCIUM: 7.9 MG/DL (ref 8.4–10.2)
CHLORIDE: 114 MEQ/L (ref 97–107)
COHB: 0.1 % (ref 0.5–1.5)
CREATININE: 0.72 MG/DL (ref 0.6–1.3)
DIFF_TYPE?: ABNORMAL
DRAW SITE: ABNORMAL
EOSINOPHIL: 2 % (ref 0–6)
GLOBULIN_: 3 G/DL (ref 2–4.1)
GLUCOSE LVL: 150 MG/DL (ref 70–99)
HCO3: 20.9 MMOL/L (ref 22–26)
HCT VFR BLD CALC: 27 % (ref 36–51)
HEMOLYSIS 2+: NEGATIVE
HGB BLD-MCNC: 8.6 G/DL (ref 12–17)
IMMATURE GRAN: 0.6 % (ref 0–0.3)
INHALED O2 CONCENTRATION: 35 %
INSTR WBC: 7.1 K/CUMM (ref 4–11)
LIPEMIC 3+: NEGATIVE
LYMPHOCYTE: 19 %
MAGNESIUM LEVEL: 1.5 MG/DL (ref 1.6–2.6)
MCH RBC QN AUTO: 36 PG (ref 25–35)
MCHC RBC AUTO-ENTMCNC: 32 G/DL (ref 32–37)
MCV RBC AUTO: 111 FL (ref 78–97)
METHB: 0.3 % (ref 0–1.5)
MODE: ABNORMAL
MONOCYTE: 5 %
NEUTROPHIL: 73 %
NRBC BLD MANUAL-RTO: 0 % (ref 0–0.2)
O2 SAT(EST): 96.2 %
O2HB: 95.8 % (ref 94–97)
PATIENT RESP RATE: 25 BR/MIN
PCO2: 29.8 MMHG (ref 35–45)
PEEP: 5 CMH20
PH: 7.46 (ref 7.35–7.45)
PLATELET: 118 K/CUMM (ref 150–450)
PO2: 91 MMHG (ref 75–100)
POTASSIUM: 3.4 MEQ/L (ref 3.5–5.1)
POTASSIUM: 3.5 MEQ/L (ref 3.5–5.1)
POTASSIUM: 3.7 MEQ/L (ref 3.5–5.1)
PS: 15 CMH20
RBC # BLD: 2.41 M/CUMM (ref 4.2–6)
RDW: 17.2 % (ref 11.5–14.5)
SAMPLE TYPE: ABNORMAL
SODIUM: 143 MEQ/L (ref 136–145)
TCO2: 21 MEQ/L (ref 19–29)
TECH ID: 9545
THB: 10.9 G/DL (ref 12–18)
TOTAL PROTEIN: 5 G/DL (ref 6.4–8.3)
VENTILATOR_MODE: ABNORMAL
WBC # BLD: 7.1 K/CUMM (ref 4–11)

## 2017-04-14 LAB
A/G RATIO_: 0.6
ABG GLU: 153 MG/G (ref 65–95)
ABG HCT: 32 % (ref 37–50)
ABG ION CALCIUM: 4.4 MG/DL (ref 4.5–5.3)
ABG K: 3.29 MMOL/L (ref 3.5–5.3)
ABG NA: 142.2 MMOL/L (ref 135–148)
ABS LYMPH: 1.3 K/CUMM (ref 1–3.5)
ABS MONO: 0.5 K/CUMM (ref 0.1–0.8)
ABS NEUTRO: 8.4 K/CUMM (ref 2–8)
ALBUMIN: 2.2 G/DL (ref 3.5–5)
ALK PHOS: 115 UNIT/L (ref 50–124)
ALLEN'S TEST: POSITIVE
ALT/GPT: 33 UNIT/L (ref 0–55)
ANALYZER: ABNORMAL
ANION GAP SERPL CALC-SCNC: 16 MEQ/L (ref 10–20)
AST/GOT: 106 UNIT/L (ref 5–34)
BASOPHIL: 1 % (ref 0–1)
BEVT: 0.8 MMOL/L (ref -2–3)
BILI TOTAL: 10.2 MG/DL (ref 0.2–1)
BUN SERPL-MCNC: 15 MG/DL (ref 6–20)
CALCIUM: 8.1 MG/DL (ref 8.4–10.2)
CHLORIDE: 111 MEQ/L (ref 97–107)
COHB: 0.3 % (ref 0.5–1.5)
CREATININE: 0.94 MG/DL (ref 0.6–1.3)
DIFF_TYPE?: ABNORMAL
DRAW SITE: ABNORMAL
EOSINOPHIL: 0 % (ref 0–6)
GLOBULIN_: 3.5 G/DL (ref 2–4.1)
GLUCOSE LVL: 134 MG/DL (ref 70–99)
HCO3: 22.9 MMOL/L (ref 22–26)
HCT VFR BLD CALC: 32 % (ref 36–51)
HEMOLYSIS 2+: NEGATIVE
HEMOLYSIS 2+: NORMAL
HGB BLD-MCNC: 10.3 G/DL (ref 12–17)
IMMATURE GRAN: 0.6 % (ref 0–0.3)
INHALED O2 CONCENTRATION: 35 %
INSTR WBC: 10.4 K/CUMM (ref 4–11)
LIPEMIC 3+: NEGATIVE
LYMPHOCYTE: 13 %
MAGNESIUM LEVEL: 1.5 MG/DL (ref 1.6–2.6)
MAGNESIUM LEVEL: 1.7 MG/DL (ref 1.6–2.6)
MCH RBC QN AUTO: 35 PG (ref 25–35)
MCHC RBC AUTO-ENTMCNC: 32 G/DL (ref 32–37)
MCV RBC AUTO: 110 FL (ref 78–97)
METHB: 0.2 % (ref 0–1.5)
MODE: ABNORMAL
MONOCYTE: 5 %
NEUTROPHIL: 81 %
NRBC BLD MANUAL-RTO: 0 % (ref 0–0.2)
O2 SAT(EST): 97.2 %
O2HB: 96.7 % (ref 94–97)
PATIENT RESP RATE: 27 BR/MIN
PCO2: 28.4 MMHG (ref 35–45)
PEEP: 5 CMH20
PH: 7.52 (ref 7.35–7.45)
PLATELET: 145 K/CUMM (ref 150–450)
PO2: 92.6 MMHG (ref 75–100)
POTASSIUM: 3.2 MEQ/L (ref 3.5–5.1)
POTASSIUM: 3.5 MEQ/L (ref 3.5–5.1)
POTASSIUM: 4.2 MEQ/L (ref 3.5–5.1)
PS: 15 CMH20
RBC # BLD: 2.94 M/CUMM (ref 4.2–6)
RDW: 17.6 % (ref 11.5–14.5)
SAMPLE TYPE: ABNORMAL
SODIUM: 143 MEQ/L (ref 136–145)
TCO2: 20 MEQ/L (ref 19–29)
TECH ID: 2897
THB: 10.8 G/DL (ref 12–18)
TOTAL PROTEIN: 5.7 G/DL (ref 6.4–8.3)
VENTILATOR_MODE: ABNORMAL
WBC # BLD: 10.4 K/CUMM (ref 4–11)

## 2017-04-15 LAB
A/G RATIO_: 0.7
ALBUMIN: 2.2 G/DL (ref 3.5–5)
ALK PHOS: 96 UNIT/L (ref 50–124)
ALT/GPT: 30 UNIT/L (ref 0–55)
AMMONIA LVL: 38 UMOL/L (ref 18–72)
ANION GAP SERPL CALC-SCNC: 13 MEQ/L (ref 10–20)
AST/GOT: 88 UNIT/L (ref 5–34)
BILI TOTAL: 8.7 MG/DL (ref 0.2–1)
BUN SERPL-MCNC: 10 MG/DL (ref 6–20)
CALCIUM: 7.9 MG/DL (ref 8.4–10.2)
CHLORIDE: 114 MEQ/L (ref 97–107)
CREATININE: 0.7 MG/DL (ref 0.6–1.3)
GLOBULIN_: 3.1 G/DL (ref 2–4.1)
GLUCOSE LVL: 134 MG/DL (ref 70–99)
HEMOLYSIS 1+: NEGATIVE
HEMOLYSIS 2+: NEGATIVE
HEMOLYSIS 2+: NORMAL
INR: 1.6 (ref 0.9–1.1)
LIPEMIC 3+: NEGATIVE
MAGNESIUM LEVEL: 1.9 MG/DL (ref 1.6–2.6)
POTASSIUM: 3.4 MEQ/L (ref 3.5–5.1)
PROTHROMBIN TIME: 16.4 SECONDS (ref 9.7–11.6)
SODIUM: 143 MEQ/L (ref 136–145)
TCO2: 19 MEQ/L (ref 19–29)
TOTAL PROTEIN: 5.3 G/DL (ref 6.4–8.3)

## 2017-04-16 LAB
A/G RATIO_: 0.7
ABS LYMPH: 1.1 K/CUMM (ref 1–3.5)
ABS MONO: 0.4 K/CUMM (ref 0.1–0.8)
ABS NEUTRO: 5.1 K/CUMM (ref 2–8)
ALBUMIN: 2.3 G/DL (ref 3.5–5)
ALK PHOS: 89 UNIT/L (ref 50–124)
ALT/GPT: 35 UNIT/L (ref 0–55)
ANION GAP SERPL CALC-SCNC: 13 MEQ/L (ref 10–20)
AST/GOT: 86 UNIT/L (ref 5–34)
BASOPHIL: 1 % (ref 0–1)
BILI TOTAL: 9.3 MG/DL (ref 0.2–1)
BUN SERPL-MCNC: 8 MG/DL (ref 6–20)
CALCIUM: 8.2 MG/DL (ref 8.4–10.2)
CHLORIDE: 111 MEQ/L (ref 97–107)
CREATININE: 0.67 MG/DL (ref 0.6–1.3)
DIFF_TYPE?: ABNORMAL
EOSINOPHIL: 2 % (ref 0–6)
GLOBULIN_: 3.4 G/DL (ref 2–4.1)
GLUCOSE LVL: 107 MG/DL (ref 70–99)
HCT VFR BLD CALC: 30 % (ref 36–51)
HEMOLYSIS 2+: ABNORMAL
HEMOLYSIS 2+: NEGATIVE
HGB BLD-MCNC: 9.4 G/DL (ref 12–17)
IMMATURE GRAN: 0.4 % (ref 0–0.3)
INSTR WBC: 6.7 K/CUMM (ref 4–11)
LIPEMIC 3+: NEGATIVE
LYMPHOCYTE: 16 %
MAGNESIUM LEVEL: 1.4 MG/DL (ref 1.6–2.6)
MCH RBC QN AUTO: 34 PG (ref 25–35)
MCHC RBC AUTO-ENTMCNC: 31 G/DL (ref 32–37)
MCV RBC AUTO: 110 FL (ref 78–97)
MONOCYTE: 6 %
NEUTROPHIL: 76 %
NRBC BLD MANUAL-RTO: 0 % (ref 0–0.2)
PLATELET: 103 K/CUMM (ref 150–450)
POTASSIUM: 4.2 MEQ/L (ref 3.5–5.1)
RBC # BLD: 2.73 M/CUMM (ref 4.2–6)
RDW: 16.4 % (ref 11.5–14.5)
SODIUM: 144 MEQ/L (ref 136–145)
TCO2: 24 MEQ/L (ref 19–29)
TOTAL PROTEIN: 5.7 G/DL (ref 6.4–8.3)
VANCO TR: 13.4 UG/ML (ref 5–15)
WBC # BLD: 6.7 K/CUMM (ref 4–11)

## 2017-04-17 LAB
A/G RATIO_: 0.7
ABS LYMPH: 1 K/CUMM (ref 1–3.5)
ABS MONO: 0.4 K/CUMM (ref 0.1–0.8)
ABS NEUTRO: 4.6 K/CUMM (ref 2–8)
ALBUMIN: 2.3 G/DL (ref 3.5–5)
ALK PHOS: 102 UNIT/L (ref 50–124)
ALT/GPT: 31 UNIT/L (ref 0–55)
ANION GAP SERPL CALC-SCNC: 12 MEQ/L (ref 10–20)
AST/GOT: 82 UNIT/L (ref 5–34)
BASOPHIL: 1 % (ref 0–1)
BILI TOTAL: 7.6 MG/DL (ref 0.2–1)
BUN SERPL-MCNC: 12 MG/DL (ref 6–20)
CALCIUM: 8.6 MG/DL (ref 8.4–10.2)
CHLORIDE: 111 MEQ/L (ref 97–107)
CREATININE: 0.74 MG/DL (ref 0.6–1.3)
DIFF_TYPE?: ABNORMAL
EOSINOPHIL: 2 % (ref 0–6)
GLOBULIN_: 3.5 G/DL (ref 2–4.1)
GLUCOSE LVL: 129 MG/DL (ref 70–99)
HCT VFR BLD CALC: 30 % (ref 36–51)
HEMOLYSIS 2+: NEGATIVE
HGB BLD-MCNC: 9.3 G/DL (ref 12–17)
IMMATURE GRAN: 0.3 % (ref 0–0.3)
INSTR WBC: 6.2 K/CUMM (ref 4–11)
LIPEMIC 3+: NEGATIVE
LYMPHOCYTE: 16 %
MAGNESIUM LEVEL: 1.5 MG/DL (ref 1.6–2.6)
MCH RBC QN AUTO: 34 PG (ref 25–35)
MCHC RBC AUTO-ENTMCNC: 31 G/DL (ref 32–37)
MCV RBC AUTO: 110 FL (ref 78–97)
MONOCYTE: 6 %
NEUTROPHIL: 74 %
NRBC BLD MANUAL-RTO: 0 % (ref 0–0.2)
PLATELET: 118 K/CUMM (ref 150–450)
POTASSIUM: 3.6 MEQ/L (ref 3.5–5.1)
POTASSIUM: 3.6 MEQ/L (ref 3.5–5.1)
RBC # BLD: 2.7 M/CUMM (ref 4.2–6)
RDW: 16 % (ref 11.5–14.5)
SODIUM: 145 MEQ/L (ref 136–145)
TCO2: 26 MEQ/L (ref 19–29)
TOTAL PROTEIN: 5.8 G/DL (ref 6.4–8.3)
WBC # BLD: 6.2 K/CUMM (ref 4–11)

## 2017-04-18 LAB
ABS LYMPH: 1.3 K/CUMM (ref 1–3.5)
ABS MONO: 0.4 K/CUMM (ref 0.1–0.8)
ABS NEUTRO: 4.3 K/CUMM (ref 2–8)
ANION GAP SERPL CALC-SCNC: 13 MEQ/L (ref 10–20)
BASOPHIL: 1 % (ref 0–1)
BUN SERPL-MCNC: 13 MG/DL (ref 6–20)
CALCIUM: 8.4 MG/DL (ref 8.4–10.2)
CHLORIDE: 110 MEQ/L (ref 97–107)
CREATININE: 0.74 MG/DL (ref 0.6–1.3)
DIFF_TYPE?: ABNORMAL
EOSINOPHIL: 2 % (ref 0–6)
GLUCOSE LVL: 132 MG/DL (ref 70–99)
HCT VFR BLD CALC: 31 % (ref 36–51)
HEMOLYSIS 2+: NEGATIVE
HEMOLYSIS 2+: NEGATIVE
HGB BLD-MCNC: 9.8 G/DL (ref 12–17)
IMMATURE GRAN: 0.3 % (ref 0–0.3)
INSTR WBC: 6.2 K/CUMM (ref 4–11)
LYMPHOCYTE: 21 %
MAGNESIUM LEVEL: 1.5 MG/DL (ref 1.6–2.6)
MCH RBC QN AUTO: 34 PG (ref 25–35)
MCHC RBC AUTO-ENTMCNC: 31 G/DL (ref 32–37)
MCV RBC AUTO: 110 FL (ref 78–97)
MONOCYTE: 6 %
NEUTROPHIL: 69 %
NRBC BLD MANUAL-RTO: 0 % (ref 0–0.2)
PLATELET: 135 K/CUMM (ref 150–450)
POTASSIUM: 3.5 MEQ/L (ref 3.5–5.1)
RBC # BLD: 2.85 M/CUMM (ref 4.2–6)
RDW: 15.6 % (ref 11.5–14.5)
SODIUM: 145 MEQ/L (ref 136–145)
TCO2: 26 MEQ/L (ref 19–29)
WBC # BLD: 6.2 K/CUMM (ref 4–11)

## 2017-04-19 LAB
ABS NEUTRO: 3.9 K/CUMM (ref 2–8)
ANION GAP SERPL CALC-SCNC: 13 MEQ/L (ref 10–20)
BUN SERPL-MCNC: 12 MG/DL (ref 6–20)
CALCIUM: 8.3 MG/DL (ref 8.4–10.2)
CHLORIDE: 109 MEQ/L (ref 97–107)
CREATININE: 0.72 MG/DL (ref 0.6–1.3)
GLUCOSE LVL: 125 MG/DL (ref 70–99)
HCT VFR BLD CALC: 31 % (ref 36–51)
HEMOLYSIS 2+: NEGATIVE
HEMOLYSIS 2+: NEGATIVE
HGB BLD-MCNC: 10 G/DL (ref 12–17)
INSTR WBC: 5.4 K/CUMM (ref 4–11)
MAGNESIUM LEVEL: 1.6 MG/DL (ref 1.6–2.6)
MCH RBC QN AUTO: 35 PG (ref 25–35)
MCHC RBC AUTO-ENTMCNC: 32 G/DL (ref 32–37)
MCV RBC AUTO: 108 FL (ref 78–97)
NRBC BLD MANUAL-RTO: 0 % (ref 0–0.2)
PLATELET: 120 K/CUMM (ref 150–450)
POTASSIUM: 3.4 MEQ/L (ref 3.5–5.1)
RBC # BLD: 2.87 M/CUMM (ref 4.2–6)
RDW: 15.7 % (ref 11.5–14.5)
SODIUM: 143 MEQ/L (ref 136–145)
TCO2: 24 MEQ/L (ref 19–29)
WBC # BLD: 5.4 K/CUMM (ref 4–11)

## 2017-04-20 LAB
A/G RATIO_: 0.6
ALBUMIN: 2.4 G/DL (ref 3.5–5)
ALK PHOS: 112 UNIT/L (ref 50–124)
ALT/GPT: 30 UNIT/L (ref 0–55)
ANION GAP SERPL CALC-SCNC: 12 MEQ/L (ref 10–20)
AST/GOT: 86 UNIT/L (ref 5–34)
BILI TOTAL: 6.3 MG/DL (ref 0.2–1)
BUN SERPL-MCNC: 12 MG/DL (ref 6–20)
CALCIUM: 8.5 MG/DL (ref 8.4–10.2)
CHLORIDE: 112 MEQ/L (ref 97–107)
CREATININE: 0.76 MG/DL (ref 0.6–1.3)
GLOBULIN_: 3.9 G/DL (ref 2–4.1)
GLUCOSE LVL: 125 MG/DL (ref 70–99)
HEMOLYSIS 2+: NEGATIVE
LIPEMIC 3+: NEGATIVE
POTASSIUM: 4 MEQ/L (ref 3.5–5.1)
SODIUM: 146 MEQ/L (ref 136–145)
TCO2: 26 MEQ/L (ref 19–29)
TOTAL PROTEIN: 6.3 G/DL (ref 6.4–8.3)

## 2017-04-22 LAB
ABS NEUTRO: 3 K/CUMM (ref 2–8)
ANION GAP SERPL CALC-SCNC: 15 MEQ/L (ref 10–20)
BUN SERPL-MCNC: 13 MG/DL (ref 6–20)
CALCIUM: 8.6 MG/DL (ref 8.4–10.2)
CHLORIDE: 108 MEQ/L (ref 97–107)
CREATININE: 0.72 MG/DL (ref 0.6–1.3)
GLUCOSE LVL: 125 MG/DL (ref 70–99)
HCT VFR BLD CALC: 32 % (ref 36–51)
HEMOLYSIS 2+: NEGATIVE
HGB BLD-MCNC: 10.1 G/DL (ref 12–17)
INSTR WBC: 4.7 K/CUMM (ref 4–11)
MCH RBC QN AUTO: 34 PG (ref 25–35)
MCHC RBC AUTO-ENTMCNC: 31 G/DL (ref 32–37)
MCV RBC AUTO: 108 FL (ref 78–97)
NRBC BLD MANUAL-RTO: 0 % (ref 0–0.2)
PLATELET: 130 K/CUMM (ref 150–450)
POTASSIUM: 3.6 MEQ/L (ref 3.5–5.1)
RBC # BLD: 2.99 M/CUMM (ref 4.2–6)
RDW: 15 % (ref 11.5–14.5)
SODIUM: 142 MEQ/L (ref 136–145)
TCO2: 23 MEQ/L (ref 19–29)
WBC # BLD: 4.7 K/CUMM (ref 4–11)

## 2017-04-24 LAB
A/G RATIO_: 0.6
ABS LYMPH: 1 K/CUMM (ref 1–3.5)
ABS MONO: 0.4 K/CUMM (ref 0.1–0.8)
ABS NEUTRO: 2.9 K/CUMM (ref 2–8)
ALBUMIN: 2.4 G/DL (ref 3.5–5)
ALK PHOS: 116 UNIT/L (ref 50–124)
ALT/GPT: 29 UNIT/L (ref 0–55)
ANION GAP SERPL CALC-SCNC: 14 MEQ/L (ref 10–20)
AST/GOT: 83 UNIT/L (ref 5–34)
BASOPHIL: 1 % (ref 0–1)
BILI TOTAL: 4.8 MG/DL (ref 0.2–1)
BUN SERPL-MCNC: 11 MG/DL (ref 6–20)
CALCIUM: 8.4 MG/DL (ref 8.4–10.2)
CHLORIDE: 105 MEQ/L (ref 97–107)
CREATININE: 0.77 MG/DL (ref 0.6–1.3)
DIFF_TYPE?: ABNORMAL
EOSINOPHIL: 4 % (ref 0–6)
GLOBULIN_: 3.8 G/DL (ref 2–4.1)
GLUCOSE LVL: 115 MG/DL (ref 70–99)
HCT VFR BLD CALC: 31 % (ref 36–51)
HEMOLYSIS 2+: NEGATIVE
HEMOLYSIS 2+: NEGATIVE
HGB BLD-MCNC: 10 G/DL (ref 12–17)
IMMATURE GRAN: 0.2 % (ref 0–0.3)
INSTR WBC: 4.5 K/CUMM (ref 4–11)
LIPEMIC 3+: NEGATIVE
LYMPHOCYTE: 23 %
MAGNESIUM LEVEL: 1.6 MG/DL (ref 1.6–2.6)
MCH RBC QN AUTO: 34 PG (ref 25–35)
MCHC RBC AUTO-ENTMCNC: 32 G/DL (ref 32–37)
MCV RBC AUTO: 105 FL (ref 78–97)
MONOCYTE: 8 %
NEUTROPHIL: 63 %
NRBC BLD MANUAL-RTO: 0 % (ref 0–0.2)
PLATELET: 127 K/CUMM (ref 150–450)
POTASSIUM: 3.6 MEQ/L (ref 3.5–5.1)
RBC # BLD: 2.96 M/CUMM (ref 4.2–6)
RDW: 14.8 % (ref 11.5–14.5)
SODIUM: 141 MEQ/L (ref 136–145)
TCO2: 26 MEQ/L (ref 19–29)
TOTAL PROTEIN: 6.2 G/DL (ref 6.4–8.3)
WBC # BLD: 4.5 K/CUMM (ref 4–11)

## 2017-04-26 LAB
ABS NEUTRO: 1.9 K/CUMM (ref 2–8)
ANION GAP SERPL CALC-SCNC: 15 MEQ/L (ref 10–20)
BUN SERPL-MCNC: 10 MG/DL (ref 6–20)
CALCIUM: 8.6 MG/DL (ref 8.4–10.2)
CHLORIDE: 104 MEQ/L (ref 97–107)
CREATININE: 0.77 MG/DL (ref 0.6–1.3)
GLUCOSE LVL: 86 MG/DL (ref 70–99)
HCT VFR BLD CALC: 31 % (ref 36–51)
HEMOLYSIS 2+: NEGATIVE
HGB BLD-MCNC: 9.9 G/DL (ref 12–17)
INSTR WBC: 3.7 K/CUMM (ref 4–11)
MCH RBC QN AUTO: 33 PG (ref 25–35)
MCHC RBC AUTO-ENTMCNC: 32 G/DL (ref 32–37)
MCV RBC AUTO: 103 FL (ref 78–97)
NRBC BLD MANUAL-RTO: 0 % (ref 0–0.2)
PLATELET: 135 K/CUMM (ref 150–450)
POTASSIUM: 3.5 MEQ/L (ref 3.5–5.1)
RBC # BLD: 2.99 M/CUMM (ref 4.2–6)
RDW: 14.6 % (ref 11.5–14.5)
SODIUM: 138 MEQ/L (ref 136–145)
TCO2: 23 MEQ/L (ref 19–29)
WBC # BLD: 3.7 K/CUMM (ref 4–11)

## 2018-03-07 ENCOUNTER — HOSPITAL (OUTPATIENT)
Dept: OTHER | Age: 33
End: 2018-03-07
Attending: PHYSICIAN ASSISTANT

## 2018-03-21 ENCOUNTER — APPOINTMENT (OUTPATIENT)
Dept: GENERAL RADIOLOGY | Age: 33
DRG: 897 | End: 2018-03-21
Attending: HOSPITALIST

## 2018-03-21 ENCOUNTER — APPOINTMENT (OUTPATIENT)
Dept: CT IMAGING | Age: 33
DRG: 897 | End: 2018-03-21
Attending: HOSPITALIST

## 2018-03-21 ENCOUNTER — HOSPITAL ENCOUNTER (INPATIENT)
Age: 33
LOS: 4 days | Discharge: HOME | DRG: 897 | End: 2018-03-25
Attending: HOSPITALIST | Admitting: HOSPITALIST

## 2018-03-21 DIAGNOSIS — R56.9 ALCOHOL WITHDRAWAL SEIZURE WITH COMPLICATION (CMD): ICD-10-CM

## 2018-03-21 DIAGNOSIS — F10.929 ALCOHOLIC INTOXICATION WITH COMPLICATION (CMD): Primary | ICD-10-CM

## 2018-03-21 DIAGNOSIS — F10.939 ALCOHOL WITHDRAWAL SEIZURE WITH COMPLICATION (CMD): ICD-10-CM

## 2018-03-21 DIAGNOSIS — E83.42 HYPOMAGNESEMIA: ICD-10-CM

## 2018-03-21 DIAGNOSIS — S00.81XA ABRASION OF FACE, INITIAL ENCOUNTER: ICD-10-CM

## 2018-03-21 DIAGNOSIS — R74.8 ELEVATED LIPASE: ICD-10-CM
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
## 2018-03-21 DIAGNOSIS — S22.31XA CLOSED FRACTURE OF ONE RIB OF RIGHT SIDE, INITIAL ENCOUNTER: ICD-10-CM

## 2018-03-21 DIAGNOSIS — F10.920 ALCOHOLIC INTOXICATION WITHOUT COMPLICATION (CMD): ICD-10-CM

## 2018-03-21 LAB
ALBUMIN SERPL-MCNC: 3.8 G/DL (ref 3.6–5.1)
ALBUMIN/GLOB SERPL: 0.9 {RATIO} (ref 1–2.4)
ALP SERPL-CCNC: 121 UNITS/L (ref 45–117)
ALT SERPL-CCNC: 33 UNITS/L
ANION GAP SERPL CALC-SCNC: 18 MMOL/L (ref 10–20)
AST SERPL-CCNC: 46 UNITS/L
BASOPHILS # BLD AUTO: 0.1 K/MCL (ref 0–0.3)
BASOPHILS NFR BLD AUTO: 1 %
BILIRUB SERPL-MCNC: 0.4 MG/DL (ref 0.2–1)
BUN SERPL-MCNC: 5 MG/DL (ref 6–20)
BUN/CREAT SERPL: 6 (ref 7–25)
CALCIUM SERPL-MCNC: 8.1 MG/DL (ref 8.4–10.2)
CHLORIDE SERPL-SCNC: 107 MMOL/L (ref 98–107)
CO2 SERPL-SCNC: 26 MMOL/L (ref 21–32)
CREAT SERPL-MCNC: 0.78 MG/DL (ref 0.67–1.17)
DIFFERENTIAL METHOD BLD: ABNORMAL
EOSINOPHIL # BLD AUTO: 0.1 K/MCL (ref 0.1–0.5)
EOSINOPHIL NFR SPEC: 2 %
ERYTHROCYTE [DISTWIDTH] IN BLOOD: 17.9 % (ref 11–15)
ETHANOL SERPL-MCNC: 409 MG/DL
GLOBULIN SER-MCNC: 4.3 G/DL (ref 2–4)
GLUCOSE SERPL-MCNC: 109 MG/DL (ref 65–99)
HCT VFR BLD CALC: 48.9 % (ref 39–51)
HGB BLD-MCNC: 16.3 G/DL (ref 13–17)
LIPASE SERPL-CCNC: 752 UNITS/L (ref 73–393)
LYMPHOCYTES # BLD MANUAL: 3.9 K/MCL (ref 1–4.8)
LYMPHOCYTES NFR BLD MANUAL: 58 %
MAGNESIUM SERPL-MCNC: 1.6 MG/DL (ref 1.7–2.4)
MCH RBC QN AUTO: 27.7 PG (ref 26–34)
MCHC RBC AUTO-ENTMCNC: 33.3 G/DL (ref 32–36.5)
MCV RBC AUTO: 83.2 FL (ref 78–100)
MONOCYTES # BLD MANUAL: 0.2 K/MCL (ref 0.3–0.9)
MONOCYTES NFR BLD MANUAL: 3 %
NEUTROPHILS # BLD: 2.5 K/MCL (ref 1.8–7.7)
NEUTROPHILS NFR BLD AUTO: 36 %
PLATELET # BLD: 141 K/MCL (ref 140–450)
POTASSIUM SERPL-SCNC: 3.7 MMOL/L (ref 3.4–5.1)
PROT SERPL-MCNC: 8.1 G/DL (ref 6.4–8.2)
RBC # BLD: 5.88 MIL/MCL (ref 4.5–5.9)
SODIUM SERPL-SCNC: 147 MMOL/L (ref 135–145)
TROPONIN I SERPL-MCNC: <0.02 NG/ML
WBC # BLD: 6.8 K/MCL (ref 4.2–11)

## 2018-03-21 PROCEDURE — 83735 ASSAY OF MAGNESIUM: CPT

## 2018-03-21 PROCEDURE — 70486 CT MAXILLOFACIAL W/O DYE: CPT | Performed by: RADIOLOGY

## 2018-03-21 PROCEDURE — 10000008 HB ROOM CHARGE ICU OR CCU

## 2018-03-21 PROCEDURE — 80053 COMPREHEN METABOLIC PANEL: CPT

## 2018-03-21 PROCEDURE — 70486 CT MAXILLOFACIAL W/O DYE: CPT

## 2018-03-21 PROCEDURE — 93010 ELECTROCARDIOGRAM REPORT: CPT | Performed by: INTERNAL MEDICINE

## 2018-03-21 PROCEDURE — 10002807 HB RX 258: Performed by: HOSPITALIST

## 2018-03-21 PROCEDURE — 99285 EMERGENCY DEPT VISIT HI MDM: CPT

## 2018-03-21 PROCEDURE — 72125 CT NECK SPINE W/O DYE: CPT | Performed by: RADIOLOGY

## 2018-03-21 PROCEDURE — 99285 EMERGENCY DEPT VISIT HI MDM: CPT | Performed by: PHYSICIAN ASSISTANT

## 2018-03-21 PROCEDURE — 80320 DRUG SCREEN QUANTALCOHOLS: CPT

## 2018-03-21 PROCEDURE — 71101 X-RAY EXAM UNILAT RIBS/CHEST: CPT | Performed by: RADIOLOGY

## 2018-03-21 PROCEDURE — 10002800 HB RX 250 W HCPCS: Performed by: HOSPITALIST

## 2018-03-21 PROCEDURE — 83690 ASSAY OF LIPASE: CPT

## 2018-03-21 PROCEDURE — 70450 CT HEAD/BRAIN W/O DYE: CPT | Performed by: RADIOLOGY

## 2018-03-21 PROCEDURE — 99223 1ST HOSP IP/OBS HIGH 75: CPT | Performed by: HOSPITALIST

## 2018-03-21 PROCEDURE — 71101 X-RAY EXAM UNILAT RIBS/CHEST: CPT

## 2018-03-21 PROCEDURE — 93005 ELECTROCARDIOGRAM TRACING: CPT | Performed by: PHYSICIAN ASSISTANT

## 2018-03-21 PROCEDURE — 96365 THER/PROPH/DIAG IV INF INIT: CPT

## 2018-03-21 PROCEDURE — 70450 CT HEAD/BRAIN W/O DYE: CPT

## 2018-03-21 PROCEDURE — 10002800 HB RX 250 W HCPCS: Performed by: PHYSICIAN ASSISTANT

## 2018-03-21 PROCEDURE — HZ2ZZZZ DETOXIFICATION SERVICES FOR SUBSTANCE ABUSE TREATMENT: ICD-10-PCS | Performed by: HOSPITALIST

## 2018-03-21 PROCEDURE — 10002801 HB RX 250 W/O HCPCS: Performed by: HOSPITALIST

## 2018-03-21 PROCEDURE — 85025 COMPLETE CBC W/AUTO DIFF WBC: CPT

## 2018-03-21 PROCEDURE — 72125 CT NECK SPINE W/O DYE: CPT

## 2018-03-21 PROCEDURE — 10002807 HB RX 258: Performed by: PHYSICIAN ASSISTANT

## 2018-03-21 PROCEDURE — 84484 ASSAY OF TROPONIN QUANT: CPT

## 2018-03-21 RX ORDER — ACETAMINOPHEN 325 MG/1
650 TABLET ORAL EVERY 4 HOURS PRN
Status: DISCONTINUED | OUTPATIENT
Start: 2018-03-21 | End: 2018-03-25 | Stop reason: HOSPADM

## 2018-03-21 RX ORDER — DEXMEDETOMIDINE HYDROCHLORIDE 4 UG/ML
0-1.5 INJECTION, SOLUTION INTRAVENOUS CONTINUOUS
Status: DISCONTINUED | OUTPATIENT
Start: 2018-03-21 | End: 2018-03-25 | Stop reason: HOSPADM

## 2018-03-21 RX ORDER — NICOTINE 21 MG/24HR
1 PATCH, TRANSDERMAL 24 HOURS TRANSDERMAL DAILY
Status: DISCONTINUED | OUTPATIENT
Start: 2018-03-22 | End: 2018-03-25 | Stop reason: HOSPADM

## 2018-03-21 RX ORDER — FOLIC ACID 5 MG/ML
1 INJECTION, SOLUTION INTRAMUSCULAR; INTRAVENOUS; SUBCUTANEOUS DAILY
Status: DISCONTINUED | OUTPATIENT
Start: 2018-03-22 | End: 2018-03-22

## 2018-03-21 RX ORDER — THIAMINE HYDROCHLORIDE 100 MG/ML
100 INJECTION, SOLUTION INTRAMUSCULAR; INTRAVENOUS DAILY
Status: DISCONTINUED | OUTPATIENT
Start: 2018-03-22 | End: 2018-03-22

## 2018-03-21 RX ORDER — PAROXETINE HYDROCHLORIDE 20 MG/1
20 TABLET, FILM COATED ORAL EVERY MORNING
Status: ON HOLD | COMMUNITY
End: 2021-04-05 | Stop reason: ALTCHOICE

## 2018-03-21 RX ORDER — SODIUM CHLORIDE 9 MG/ML
INJECTION, SOLUTION INTRAVENOUS CONTINUOUS
Status: DISCONTINUED | OUTPATIENT
Start: 2018-03-21 | End: 2018-03-24

## 2018-03-21 RX ORDER — 0.9 % SODIUM CHLORIDE 0.9 %
2 VIAL (ML) INJECTION PRN
Status: DISCONTINUED | OUTPATIENT
Start: 2018-03-21 | End: 2018-03-25 | Stop reason: HOSPADM

## 2018-03-21 RX ORDER — MULTIVITAMIN,THER AND MINERALS
1 TABLET ORAL DAILY
Status: DISCONTINUED | OUTPATIENT
Start: 2018-03-22 | End: 2018-03-25 | Stop reason: HOSPADM

## 2018-03-21 RX ORDER — 0.9 % SODIUM CHLORIDE 0.9 %
2 VIAL (ML) INJECTION EVERY 12 HOURS SCHEDULED
Status: DISCONTINUED | OUTPATIENT
Start: 2018-03-21 | End: 2018-03-25 | Stop reason: HOSPADM

## 2018-03-21 RX ORDER — LIDOCAINE 4 G/G
1 PATCH TOPICAL DAILY
Status: DISCONTINUED | OUTPATIENT
Start: 2018-03-22 | End: 2018-03-25 | Stop reason: HOSPADM

## 2018-03-21 RX ORDER — ONDANSETRON 2 MG/ML
4 INJECTION INTRAMUSCULAR; INTRAVENOUS EVERY 6 HOURS PRN
Status: DISCONTINUED | OUTPATIENT
Start: 2018-03-21 | End: 2018-03-25 | Stop reason: HOSPADM

## 2018-03-21 RX ORDER — MAGNESIUM SULFATE 1 G/100ML
1 INJECTION INTRAVENOUS PRN
Status: DISCONTINUED | OUTPATIENT
Start: 2018-03-21 | End: 2018-03-25 | Stop reason: HOSPADM

## 2018-03-21 RX ORDER — ENOXAPARIN SODIUM 100 MG/ML
40 INJECTION SUBCUTANEOUS AT BEDTIME
Status: DISCONTINUED | OUTPATIENT
Start: 2018-03-21 | End: 2018-03-25 | Stop reason: HOSPADM

## 2018-03-21 RX ADMIN — DEXMEDETOMIDINE HYDROCHLORIDE 0.2 MCG/KG/HR: 4 INJECTION, SOLUTION INTRAVENOUS at 23:15

## 2018-03-21 RX ADMIN — SODIUM CHLORIDE 1000 ML: 9 INJECTION, SOLUTION INTRAVENOUS at 19:03

## 2018-03-21 RX ADMIN — SODIUM CHLORIDE: 9 INJECTION, SOLUTION INTRAVENOUS at 21:34

## 2018-03-21 RX ADMIN — MAGNESIUM SULFATE HEPTAHYDRATE 2 G: 40 INJECTION, SOLUTION INTRAVENOUS at 19:03

## 2018-03-21 RX ADMIN — MORPHINE SULFATE 2 MG: 10 INJECTION, SOLUTION INTRAMUSCULAR; INTRAVENOUS at 21:34

## 2018-03-21 ASSESSMENT — LIFESTYLE VARIABLES
TREMOR: NO TREMOR
E-CIGARETTE_USE: NO E-CIGARETTES USE IN THE LAST 30 DAYS
HOW OFTEN DO YOU HAVE A DRINK CONTAINING ALCOHOL: 4 OR MORE TIMES PER WEEK
PAROXYSMAL SWEATS: BEADS OF SWEAT OBVIOUS ON FOREHEAD
NAUSEA AND VOMITING: NO NAUSEA AND NO VOMITING
NAUSEA AND VOMITING: NO NAUSEA AND NO VOMITING
ANXIETY: MODERATELY ANXIOUS
HEADACHE, FULLNESS IN HEAD: NOT PRESENT
ANXIETY: NO ANXIETY, AT EASE
TACTILE DISTURBANCES: NOT PRESENT
VISUAL DISTURBANCES: NOT PRESENT
TACTILE DISTURBANCES: NOT PRESENT
VISUAL DISTURBANCES: NOT PRESENT
AUDITORY DISTURBANCES: NOT PRESENT
ANXIETY: NO ANXIETY, AT EASE
PAROXYSMAL SWEATS: NO SWEAT VISIBLE
AGITATION: NORMAL ACTIVITY
AGITATION: NORMAL ACTIVITY
AUDIT-C TOTAL SCORE: 6
HOW MANY STANDARD DRINKS CONTAINING ALCOHOL DO YOU HAVE ON A TYPICAL DAY: 0,1 OR 2
AUDITORY DISTURBANCES: NOT PRESENT
TREMOR: NO TREMOR
LAST DRINK YOU HAD: 48 HOURS OR LESS
PAROXYSMAL SWEATS: NO SWEAT VISIBLE
AUDITORY DISTURBANCES: NOT PRESENT
TREMOR: NO TREMOR
PRIOR ALCOHOL TREATMENT: YES
VISUAL DISTURBANCES: NOT PRESENT
AGITATION: NORMAL ACTIVITY
HEADACHE, FULLNESS IN HEAD: NOT PRESENT
HEADACHE, FULLNESS IN HEAD: VERY MILD
NAUSEA AND VOMITING: NO NAUSEA AND NO VOMITING
TACTILE DISTURBANCES: NOT PRESENT
ALCOHOL_USE_STATUS: UNHEALTHY DRINKING IDENTIFIED. AUDIT C: 3 OR MORE FOR WOMEN AND 4 OR MORE FOR MEN.
HOW OFTEN DO YOU HAVE 6 OR MORE DRINKS ON ONE OCCASION: MONTHLY
HISTORY OF PROBLEMS WHEN YOU STOP DRINKING ALCOHOL: SEIZURE;HOSPITALIZATION;DELIRIUM TREMENS (DT);HALLUCINATIONS

## 2018-03-21 ASSESSMENT — ENCOUNTER SYMPTOMS
FATIGUE: 0
LIGHT-HEADEDNESS: 0
NAUSEA: 0
ADENOPATHY: 0
CONSTIPATION: 0
CHILLS: 0
CONFUSION: 0
HEADACHES: 1
BACK PAIN: 0
PHOTOPHOBIA: 0
VOMITING: 0
DIARRHEA: 0
COLOR CHANGE: 0
ABDOMINAL PAIN: 0
SHORTNESS OF BREATH: 0
ACTIVITY CHANGE: 0
FEVER: 0
DIZZINESS: 0
SORE THROAT: 0
COUGH: 0

## 2018-03-21 ASSESSMENT — ACTIVITIES OF DAILY LIVING (ADL)
CHRONIC_PAIN_PRESENT: NO
ADL_BEFORE_ADMISSION: INDEPENDENT
RECENT_DECLINE_ADL: NO
ADL_SCORE: 12
ADL_SHORT_OF_BREATH: NO

## 2018-03-21 ASSESSMENT — COGNITIVE AND FUNCTIONAL STATUS - GENERAL
ARE YOU BLIND OR DO YOU HAVE SERIOUS DIFFICULTY SEEING, EVEN WHEN WEARING GLASSES: NO
ARE YOU DEAF OR DO YOU HAVE SERIOUS DIFFICULTY  HEARING: NO

## 2018-03-21 ASSESSMENT — PATIENT HEALTH QUESTIONNAIRE - PHQ9
CLINICAL INTERPRETATION OF PHQ9 SCORE: MODERATELY SEVERE DEPRESSION
IS PATIENT ABLE TO COMPLETE PHQ2 OR PHQ9: YES
SUM OF ALL RESPONSES TO PHQ QUESTIONS 1-9: 18

## 2018-03-21 ASSESSMENT — PAIN SCALES - GENERAL
PAIN_LEVEL_WITH_ACTIVITY: 8
PAINLEVEL_OUTOF10: 6
PAIN_LEVEL_AT_REST: 9
PAIN_LEVEL_AT_REST: 4
PAINLEVEL_OUTOF10: 6
PAIN_LEVEL_AT_REST: 8

## 2018-03-22 PROBLEM — F10.939 ALCOHOL WITHDRAWAL SYNDROME WITH COMPLICATION (CMD): Status: ACTIVE | Noted: 2018-03-22

## 2018-03-22 LAB
AMPHETAMINES UR QL: NEGATIVE
ANION GAP SERPL CALC-SCNC: 14 MMOL/L (ref 10–20)
APPEARANCE UR: CLEAR
ATRIAL RATE (BPM): 108
BARBITURATES UR QL: NEGATIVE
BASOPHILS # BLD AUTO: 0 K/MCL (ref 0–0.3)
BASOPHILS NFR BLD AUTO: 1 %
BENZODIAZ UR QL: POSITIVE
BILIRUB UR QL STRIP: NEGATIVE
BUN SERPL-MCNC: 5 MG/DL (ref 6–20)
BUN/CREAT SERPL: 7 (ref 7–25)
BZE UR QL: NEGATIVE
CALCIUM SERPL-MCNC: 7.2 MG/DL (ref 8.4–10.2)
CANNABINOIDS UR QL SCN: NEGATIVE
CHLORIDE SERPL-SCNC: 109 MMOL/L (ref 98–107)
CO2 SERPL-SCNC: 25 MMOL/L (ref 21–32)
COLOR UR: YELLOW
CREAT SERPL-MCNC: 0.68 MG/DL (ref 0.67–1.17)
DIASTOLIC BLOOD PRESSURE: 104
DIFFERENTIAL METHOD BLD: ABNORMAL
EOSINOPHIL # BLD AUTO: 0.1 K/MCL (ref 0.1–0.5)
EOSINOPHIL NFR SPEC: 2 %
ERYTHROCYTE [DISTWIDTH] IN BLOOD: 17.2 % (ref 11–15)
GLUCOSE SERPL-MCNC: 85 MG/DL (ref 65–99)
GLUCOSE UR STRIP-MCNC: NEGATIVE MG/DL
HCT VFR BLD CALC: 39.7 % (ref 39–51)
HGB BLD-MCNC: 13.1 G/DL (ref 13–17)
HGB UR QL STRIP: NEGATIVE
KETONES UR STRIP-MCNC: NEGATIVE MG/DL
LEUKOCYTE ESTERASE UR QL STRIP: NEGATIVE
LIPASE SERPL-CCNC: 174 UNITS/L (ref 73–393)
LYMPHOCYTES # BLD MANUAL: 2.9 K/MCL (ref 1–4.8)
LYMPHOCYTES NFR BLD MANUAL: 64 %
MAGNESIUM SERPL-MCNC: 1.4 MG/DL (ref 1.7–2.4)
MCH RBC QN AUTO: 28.4 PG (ref 26–34)
MCHC RBC AUTO-ENTMCNC: 33 G/DL (ref 32–36.5)
MCV RBC AUTO: 85.9 FL (ref 78–100)
MONOCYTES # BLD MANUAL: 0.3 K/MCL (ref 0.3–0.9)
MONOCYTES NFR BLD MANUAL: 6 %
NEUTROPHILS # BLD: 1.2 K/MCL (ref 1.8–7.7)
NEUTROPHILS NFR BLD AUTO: 27 %
NITRITE UR QL STRIP: NEGATIVE
OPIATES UR QL: POSITIVE
P AXIS (DEGREES): 74
PCP UR QL: NEGATIVE
PH UR STRIP: 5.5 UNITS (ref 5–7)
PLATELET # BLD: 91 K/MCL (ref 140–450)
POTASSIUM SERPL-SCNC: 3.8 MMOL/L (ref 3.4–5.1)
POTASSIUM SERPL-SCNC: 4.2 MMOL/L (ref 3.4–5.1)
PR-INTERVAL (MSEC): 154
PROT UR STRIP-MCNC: ABNORMAL MG/DL
QRS-INTERVAL (MSEC): 76
QT-INTERVAL (MSEC): 338
QTC: 452
R AXIS (DEGREES): 41
RBC # BLD: 4.62 MIL/MCL (ref 4.5–5.9)
REPORT TEXT: NORMAL
SODIUM SERPL-SCNC: 144 MMOL/L (ref 135–145)
SP GR UR STRIP: >1.03 (ref 1–1.03)
SPECIMEN SOURCE: ABNORMAL
SYSTOLIC BLOOD PRESSURE: 158
T AXIS (DEGREES): 18
UROBILINOGEN UR STRIP-MCNC: 1 MG/DL (ref 0–1)
VENTRICULAR RATE EKG/MIN (BPM): 108
WBC # BLD: 4.5 K/MCL (ref 4.2–11)

## 2018-03-22 PROCEDURE — 10002016 HB COUNTER INCENTIVE SPIROMETRY

## 2018-03-22 PROCEDURE — 10002807 HB RX 258: Performed by: HOSPITALIST

## 2018-03-22 PROCEDURE — 10004281 HB COUNTER-STAFF TIME PER 15 MIN

## 2018-03-22 PROCEDURE — 99233 SBSQ HOSP IP/OBS HIGH 50: CPT | Performed by: HOSPITALIST

## 2018-03-22 PROCEDURE — 36415 COLL VENOUS BLD VENIPUNCTURE: CPT

## 2018-03-22 PROCEDURE — 10002800 HB RX 250 W HCPCS: Performed by: HOSPITALIST

## 2018-03-22 PROCEDURE — 83735 ASSAY OF MAGNESIUM: CPT

## 2018-03-22 PROCEDURE — 84132 ASSAY OF SERUM POTASSIUM: CPT

## 2018-03-22 PROCEDURE — 80307 DRUG TEST PRSMV CHEM ANLYZR: CPT

## 2018-03-22 PROCEDURE — 10002803 HB RX 637: Performed by: HOSPITALIST

## 2018-03-22 PROCEDURE — 10004651 HB RX, NO CHARGE ITEM: Performed by: HOSPITALIST

## 2018-03-22 PROCEDURE — 10002801 HB RX 250 W/O HCPCS: Performed by: HOSPITALIST

## 2018-03-22 PROCEDURE — 80048 BASIC METABOLIC PNL TOTAL CA: CPT

## 2018-03-22 PROCEDURE — 85025 COMPLETE CBC W/AUTO DIFF WBC: CPT

## 2018-03-22 PROCEDURE — 10000008 HB ROOM CHARGE ICU OR CCU

## 2018-03-22 PROCEDURE — 83690 ASSAY OF LIPASE: CPT

## 2018-03-22 PROCEDURE — 81003 URINALYSIS AUTO W/O SCOPE: CPT

## 2018-03-22 RX ORDER — FOLIC ACID 1 MG/1
1 TABLET ORAL DAILY
Status: DISCONTINUED | OUTPATIENT
Start: 2018-03-22 | End: 2018-03-25 | Stop reason: HOSPADM

## 2018-03-22 RX ORDER — POTASSIUM CHLORIDE 20 MEQ/1
20 TABLET, EXTENDED RELEASE ORAL EVERY 4 HOURS PRN
Status: DISCONTINUED | OUTPATIENT
Start: 2018-03-22 | End: 2018-03-25 | Stop reason: HOSPADM

## 2018-03-22 RX ORDER — LANOLIN ALCOHOL/MO/W.PET/CERES
100 CREAM (GRAM) TOPICAL DAILY
Status: DISCONTINUED | OUTPATIENT
Start: 2018-03-22 | End: 2018-03-25 | Stop reason: HOSPADM

## 2018-03-22 RX ORDER — POTASSIUM CHLORIDE 1.5 G/1.58G
40 POWDER, FOR SOLUTION ORAL EVERY 4 HOURS PRN
Status: DISCONTINUED | OUTPATIENT
Start: 2018-03-22 | End: 2018-03-25 | Stop reason: HOSPADM

## 2018-03-22 RX ORDER — POTASSIUM CHLORIDE 14.9 MG/ML
40 INJECTION INTRAVENOUS EVERY 4 HOURS PRN
Status: DISCONTINUED | OUTPATIENT
Start: 2018-03-22 | End: 2018-03-25 | Stop reason: HOSPADM

## 2018-03-22 RX ORDER — HYDROCODONE BITARTRATE AND ACETAMINOPHEN 5; 325 MG/1; MG/1
1-2 TABLET ORAL EVERY 6 HOURS PRN
Status: DISCONTINUED | OUTPATIENT
Start: 2018-03-22 | End: 2018-03-25 | Stop reason: HOSPADM

## 2018-03-22 RX ORDER — IPRATROPIUM BROMIDE AND ALBUTEROL SULFATE 2.5; .5 MG/3ML; MG/3ML
3 SOLUTION RESPIRATORY (INHALATION) EVERY 4 HOURS PRN
Status: DISCONTINUED | OUTPATIENT
Start: 2018-03-22 | End: 2018-03-25 | Stop reason: HOSPADM

## 2018-03-22 RX ORDER — POTASSIUM CHLORIDE 14.9 MG/ML
20 INJECTION INTRAVENOUS EVERY 4 HOURS PRN
Status: DISCONTINUED | OUTPATIENT
Start: 2018-03-22 | End: 2018-03-25 | Stop reason: HOSPADM

## 2018-03-22 RX ORDER — POTASSIUM CHLORIDE 20 MEQ/1
40 TABLET, EXTENDED RELEASE ORAL EVERY 4 HOURS PRN
Status: DISCONTINUED | OUTPATIENT
Start: 2018-03-22 | End: 2018-03-25 | Stop reason: HOSPADM

## 2018-03-22 RX ORDER — PANTOPRAZOLE SODIUM 40 MG/1
40 TABLET, DELAYED RELEASE ORAL NIGHTLY
Status: DISCONTINUED | OUTPATIENT
Start: 2018-03-22 | End: 2018-03-25 | Stop reason: HOSPADM

## 2018-03-22 RX ORDER — POTASSIUM CHLORIDE 1.5 G/1.58G
20 POWDER, FOR SOLUTION ORAL EVERY 4 HOURS PRN
Status: DISCONTINUED | OUTPATIENT
Start: 2018-03-22 | End: 2018-03-25 | Stop reason: HOSPADM

## 2018-03-22 RX ORDER — MAGNESIUM SULFATE 1 G/100ML
1 INJECTION INTRAVENOUS DAILY PRN
Status: DISCONTINUED | OUTPATIENT
Start: 2018-03-22 | End: 2018-03-25 | Stop reason: HOSPADM

## 2018-03-22 RX ADMIN — MORPHINE SULFATE 2 MG: 10 INJECTION, SOLUTION INTRAMUSCULAR; INTRAVENOUS at 12:56

## 2018-03-22 RX ADMIN — MORPHINE SULFATE 2 MG: 10 INJECTION, SOLUTION INTRAMUSCULAR; INTRAVENOUS at 07:55

## 2018-03-22 RX ADMIN — POTASSIUM CHLORIDE 20 MEQ: 1500 TABLET, EXTENDED RELEASE ORAL at 08:33

## 2018-03-22 RX ADMIN — Medication 100 MG: at 08:33

## 2018-03-22 RX ADMIN — SODIUM CHLORIDE: 9 INJECTION, SOLUTION INTRAVENOUS at 15:39

## 2018-03-22 RX ADMIN — PANTOPRAZOLE SODIUM 40 MG: 40 TABLET, DELAYED RELEASE ORAL at 21:00

## 2018-03-22 RX ADMIN — MORPHINE SULFATE 2 MG: 10 INJECTION, SOLUTION INTRAMUSCULAR; INTRAVENOUS at 15:34

## 2018-03-22 RX ADMIN — MORPHINE SULFATE 2 MG: 10 INJECTION, SOLUTION INTRAMUSCULAR; INTRAVENOUS at 04:15

## 2018-03-22 RX ADMIN — SODIUM CHLORIDE, PRESERVATIVE FREE 2 ML: 5 INJECTION INTRAVENOUS at 21:00

## 2018-03-22 RX ADMIN — MORPHINE SULFATE 2 MG: 10 INJECTION, SOLUTION INTRAMUSCULAR; INTRAVENOUS at 21:11

## 2018-03-22 RX ADMIN — MAGNESIUM SULFATE HEPTAHYDRATE 2 G: 40 INJECTION, SOLUTION INTRAVENOUS at 08:33

## 2018-03-22 RX ADMIN — MORPHINE SULFATE 2 MG: 10 INJECTION, SOLUTION INTRAMUSCULAR; INTRAVENOUS at 10:50

## 2018-03-22 RX ADMIN — Medication 1 TABLET: at 08:01

## 2018-03-22 RX ADMIN — DEXMEDETOMIDINE HYDROCHLORIDE 0.2 MCG/KG/HR: 4 INJECTION, SOLUTION INTRAVENOUS at 12:52

## 2018-03-22 RX ADMIN — ACETAMINOPHEN 650 MG: 325 TABLET ORAL at 02:55

## 2018-03-22 RX ADMIN — NICOTINE 1 PATCH: 21 PATCH TRANSDERMAL at 07:57

## 2018-03-22 RX ADMIN — FOLIC ACID 1 MG: 1 TABLET ORAL at 08:33

## 2018-03-22 RX ADMIN — MORPHINE SULFATE 2 MG: 10 INJECTION, SOLUTION INTRAMUSCULAR; INTRAVENOUS at 17:48

## 2018-03-22 RX ADMIN — ENOXAPARIN SODIUM 40 MG: 40 INJECTION SUBCUTANEOUS at 21:00

## 2018-03-22 RX ADMIN — LIDOCAINE 1 PATCH: 246 PATCH TOPICAL at 07:58

## 2018-03-22 ASSESSMENT — LIFESTYLE VARIABLES
AGITATION: NORMAL ACTIVITY
NAUSEA AND VOMITING: NO NAUSEA AND NO VOMITING
PAROXYSMAL SWEATS: NO SWEAT VISIBLE
TREMOR: 2
AUDITORY DISTURBANCES: NOT PRESENT
HEADACHE, FULLNESS IN HEAD: MILD
TACTILE DISTURBANCES: NOT PRESENT
TACTILE DISTURBANCES: NOT PRESENT
PAROXYSMAL SWEATS: NO SWEAT VISIBLE
AGITATION: NORMAL ACTIVITY
ANXIETY: NO ANXIETY, AT EASE
VISUAL DISTURBANCES: NOT PRESENT
TREMOR: MODERATE, WITH PATIENT'S ARMS EXTENDED
ANXIETY: MILDLY ANXIOUS
AUDITORY DISTURBANCES: NOT PRESENT
VISUAL DISTURBANCES: NOT PRESENT
NAUSEA AND VOMITING: NO NAUSEA AND NO VOMITING
AGITATION: NORMAL ACTIVITY
VISUAL DISTURBANCES: NOT PRESENT
AUDITORY DISTURBANCES: NOT PRESENT
NAUSEA AND VOMITING: MILD NAUSEA WITH NO VOMITING
ANXIETY: MILDLY ANXIOUS
ANXIETY: MILDLY ANXIOUS
AUDITORY DISTURBANCES: NOT PRESENT
TACTILE DISTURBANCES: NOT PRESENT
PAROXYSMAL SWEATS: 3
ANXIETY: NO ANXIETY, AT EASE
AUDITORY DISTURBANCES: NOT PRESENT
TREMOR: 2
AGITATION: NORMAL ACTIVITY
TREMOR: 2
PAROXYSMAL SWEATS: NO SWEAT VISIBLE
TACTILE DISTURBANCES: NOT PRESENT
VISUAL DISTURBANCES: NOT PRESENT
TACTILE DISTURBANCES: NOT PRESENT
PAROXYSMAL SWEATS: NO SWEAT VISIBLE
NAUSEA AND VOMITING: NO NAUSEA AND NO VOMITING
VISUAL DISTURBANCES: NOT PRESENT
TREMOR: NO TREMOR
AGITATION: NORMAL ACTIVITY
NAUSEA AND VOMITING: NO NAUSEA AND NO VOMITING
HEADACHE, FULLNESS IN HEAD: NOT PRESENT
TACTILE DISTURBANCES: NOT PRESENT
AGITATION: NORMAL ACTIVITY
VISUAL DISTURBANCES: NOT PRESENT
HEADACHE, FULLNESS IN HEAD: VERY MILD
HEADACHE, FULLNESS IN HEAD: NOT PRESENT
TREMOR: MODERATE, WITH PATIENT'S ARMS EXTENDED
NAUSEA AND VOMITING: NO NAUSEA AND NO VOMITING
HEADACHE, FULLNESS IN HEAD: NOT PRESENT
AUDITORY DISTURBANCES: NOT PRESENT
PAROXYSMAL SWEATS: NO SWEAT VISIBLE
ANXIETY: MILDLY ANXIOUS
HEADACHE, FULLNESS IN HEAD: VERY MILD

## 2018-03-22 ASSESSMENT — PAIN SCALES - GENERAL
PAIN_LEVEL_AT_REST: 5
PAIN_LEVEL_WITH_ACTIVITY: 9
PAIN_LEVEL_AT_REST: 5
PAIN_LEVEL_AT_REST: 7
PAIN_LEVEL_WITH_ACTIVITY: 5
PAIN_LEVEL_AT_REST: 9
PAIN_LEVEL_WITH_ACTIVITY: 9
PAIN_LEVEL_AT_REST: 5
PAIN_LEVEL_WITH_ACTIVITY: 0
PAIN_LEVEL_AT_REST: 0
PAIN_LEVEL_WITH_ACTIVITY: 5
PAIN_LEVEL_AT_REST: 5
PAIN_LEVEL_WITH_ACTIVITY: 5
PAIN_LEVEL_AT_REST: 9
PAIN_LEVEL_WITH_ACTIVITY: 5
PAIN_LEVEL_WITH_ACTIVITY: 9
PAIN_LEVEL_AT_REST: 9
PAIN_LEVEL_WITH_ACTIVITY: 9
PAIN_LEVEL_AT_REST: 9
PAIN_LEVEL_AT_REST: 5
PAIN_LEVEL_AT_REST: 9
PAIN_LEVEL_WITH_ACTIVITY: 9
PAIN_LEVEL_WITH_ACTIVITY: 5
PAIN_LEVEL_AT_REST: 9
PAIN_LEVEL_AT_REST: 9

## 2018-03-23 ENCOUNTER — APPOINTMENT (OUTPATIENT)
Dept: GENERAL RADIOLOGY | Age: 33
DRG: 897 | End: 2018-03-23
Attending: HOSPITALIST

## 2018-03-23 ENCOUNTER — APPOINTMENT (OUTPATIENT)
Dept: CT IMAGING | Age: 33
DRG: 897 | End: 2018-03-23
Attending: HOSPITALIST

## 2018-03-23 PROBLEM — M25.571 RIGHT ANKLE PAIN: Status: ACTIVE | Noted: 2018-03-23

## 2018-03-23 LAB
ALBUMIN SERPL-MCNC: 2.8 G/DL (ref 3.6–5.1)
ALP SERPL-CCNC: 103 UNITS/L (ref 45–117)
ALT SERPL-CCNC: 14 UNITS/L
ANION GAP SERPL CALC-SCNC: 11 MMOL/L (ref 10–20)
AST SERPL-CCNC: 28 UNITS/L
BILIRUB CONJ SERPL-MCNC: 0.3 MG/DL (ref 0–0.2)
BILIRUB SERPL-MCNC: 1.3 MG/DL (ref 0.2–1)
BUN SERPL-MCNC: 5 MG/DL (ref 6–20)
BUN/CREAT SERPL: 8 (ref 7–25)
CALCIUM SERPL-MCNC: 7.7 MG/DL (ref 8.4–10.2)
CHLORIDE SERPL-SCNC: 103 MMOL/L (ref 98–107)
CO2 SERPL-SCNC: 26 MMOL/L (ref 21–32)
CREAT SERPL-MCNC: 0.65 MG/DL (ref 0.67–1.17)
GLUCOSE SERPL-MCNC: 96 MG/DL (ref 65–99)
MAGNESIUM SERPL-MCNC: 1.4 MG/DL (ref 1.7–2.4)
POTASSIUM SERPL-SCNC: 3.9 MMOL/L (ref 3.4–5.1)
POTASSIUM SERPL-SCNC: 4.2 MMOL/L (ref 3.4–5.1)
PROT SERPL-MCNC: 6.5 G/DL (ref 6.4–8.2)
SODIUM SERPL-SCNC: 136 MMOL/L (ref 135–145)

## 2018-03-23 PROCEDURE — 84132 ASSAY OF SERUM POTASSIUM: CPT

## 2018-03-23 PROCEDURE — 10002800 HB RX 250 W HCPCS: Performed by: HOSPITALIST

## 2018-03-23 PROCEDURE — 80048 BASIC METABOLIC PNL TOTAL CA: CPT

## 2018-03-23 PROCEDURE — 73700 CT LOWER EXTREMITY W/O DYE: CPT | Performed by: RADIOLOGY

## 2018-03-23 PROCEDURE — 10002803 HB RX 637: Performed by: HOSPITALIST

## 2018-03-23 PROCEDURE — 73600 X-RAY EXAM OF ANKLE: CPT | Performed by: RADIOLOGY

## 2018-03-23 PROCEDURE — 10002801 HB RX 250 W/O HCPCS: Performed by: HOSPITALIST

## 2018-03-23 PROCEDURE — 10000008 HB ROOM CHARGE ICU OR CCU

## 2018-03-23 PROCEDURE — 99233 SBSQ HOSP IP/OBS HIGH 50: CPT | Performed by: HOSPITALIST

## 2018-03-23 PROCEDURE — 10002807 HB RX 258: Performed by: HOSPITALIST

## 2018-03-23 PROCEDURE — 73600 X-RAY EXAM OF ANKLE: CPT

## 2018-03-23 PROCEDURE — 73700 CT LOWER EXTREMITY W/O DYE: CPT

## 2018-03-23 PROCEDURE — 36415 COLL VENOUS BLD VENIPUNCTURE: CPT

## 2018-03-23 PROCEDURE — 80076 HEPATIC FUNCTION PANEL: CPT

## 2018-03-23 PROCEDURE — 10004651 HB RX, NO CHARGE ITEM: Performed by: HOSPITALIST

## 2018-03-23 PROCEDURE — 83735 ASSAY OF MAGNESIUM: CPT

## 2018-03-23 RX ORDER — LORAZEPAM 2 MG/ML
2 INJECTION INTRAMUSCULAR
Status: DISCONTINUED | OUTPATIENT
Start: 2018-03-23 | End: 2018-03-25 | Stop reason: HOSPADM

## 2018-03-23 RX ADMIN — SODIUM CHLORIDE: 9 INJECTION, SOLUTION INTRAVENOUS at 00:30

## 2018-03-23 RX ADMIN — ENOXAPARIN SODIUM 40 MG: 40 INJECTION SUBCUTANEOUS at 21:30

## 2018-03-23 RX ADMIN — Medication 100 MG: at 07:51

## 2018-03-23 RX ADMIN — SODIUM CHLORIDE: 9 INJECTION, SOLUTION INTRAVENOUS at 10:24

## 2018-03-23 RX ADMIN — NICOTINE 1 PATCH: 21 PATCH TRANSDERMAL at 07:51

## 2018-03-23 RX ADMIN — HYDROCODONE BITARTRATE AND ACETAMINOPHEN 2 TABLET: 5; 325 TABLET ORAL at 12:25

## 2018-03-23 RX ADMIN — Medication 1 TABLET: at 07:51

## 2018-03-23 RX ADMIN — MORPHINE SULFATE 2 MG: 10 INJECTION, SOLUTION INTRAMUSCULAR; INTRAVENOUS at 05:00

## 2018-03-23 RX ADMIN — MAGNESIUM SULFATE HEPTAHYDRATE 2 G: 40 INJECTION, SOLUTION INTRAVENOUS at 07:52

## 2018-03-23 RX ADMIN — HYDROCODONE BITARTRATE AND ACETAMINOPHEN 2 TABLET: 5; 325 TABLET ORAL at 07:52

## 2018-03-23 RX ADMIN — LIDOCAINE 1 PATCH: 246 PATCH TOPICAL at 07:52

## 2018-03-23 RX ADMIN — FOLIC ACID 1 MG: 1 TABLET ORAL at 07:51

## 2018-03-23 RX ADMIN — SODIUM CHLORIDE: 9 INJECTION, SOLUTION INTRAVENOUS at 19:48

## 2018-03-23 RX ADMIN — SODIUM CHLORIDE, PRESERVATIVE FREE 2 ML: 5 INJECTION INTRAVENOUS at 21:31

## 2018-03-23 RX ADMIN — LORAZEPAM 2 MG: 2 INJECTION INTRAMUSCULAR; INTRAVENOUS at 00:25

## 2018-03-23 RX ADMIN — HYDROCODONE BITARTRATE AND ACETAMINOPHEN 2 TABLET: 5; 325 TABLET ORAL at 01:26

## 2018-03-23 RX ADMIN — DEXMEDETOMIDINE HYDROCHLORIDE 0.2 MCG/KG/HR: 4 INJECTION, SOLUTION INTRAVENOUS at 19:47

## 2018-03-23 RX ADMIN — POTASSIUM CHLORIDE 20 MEQ: 1500 TABLET, EXTENDED RELEASE ORAL at 07:52

## 2018-03-23 RX ADMIN — HYDROCODONE BITARTRATE AND ACETAMINOPHEN 2 TABLET: 5; 325 TABLET ORAL at 18:24

## 2018-03-23 RX ADMIN — PANTOPRAZOLE SODIUM 40 MG: 40 TABLET, DELAYED RELEASE ORAL at 21:31

## 2018-03-23 ASSESSMENT — PAIN SCALES - GENERAL
PAIN_LEVEL_AT_REST: 5
PAIN_LEVEL_AT_REST: 5
PAIN_LEVEL_AT_REST: 7
PAIN_LEVEL_AT_REST: 9
PAIN_LEVEL_AT_REST: 6
PAIN_LEVEL_WITH_ACTIVITY: 6
PAIN_LEVEL_WITH_ACTIVITY: 6
PAIN_LEVEL_WITH_ACTIVITY: 9
PAIN_LEVEL_WITH_ACTIVITY: 5
PAIN_LEVEL_WITH_ACTIVITY: 6

## 2018-03-23 ASSESSMENT — LIFESTYLE VARIABLES
HEADACHE, FULLNESS IN HEAD: NOT PRESENT
VISUAL DISTURBANCES: NOT PRESENT
PAROXYSMAL SWEATS: NO SWEAT VISIBLE
HEADACHE, FULLNESS IN HEAD: NOT PRESENT
NAUSEA AND VOMITING: NO NAUSEA AND NO VOMITING
ANXIETY: NO ANXIETY, AT EASE
HEADACHE, FULLNESS IN HEAD: NOT PRESENT
AUDITORY DISTURBANCES: NOT PRESENT
TREMOR: 2
ANXIETY: NO ANXIETY, AT EASE
NAUSEA AND VOMITING: NO NAUSEA AND NO VOMITING
VISUAL DISTURBANCES: NOT PRESENT
ANXIETY: NO ANXIETY, AT EASE
TACTILE DISTURBANCES: NOT PRESENT
VISUAL DISTURBANCES: NOT PRESENT
ANXIETY: NO ANXIETY, AT EASE
AGITATION: NORMAL ACTIVITY
AUDITORY DISTURBANCES: NOT PRESENT
TREMOR: 2
NAUSEA AND VOMITING: NO NAUSEA AND NO VOMITING
NAUSEA AND VOMITING: NO NAUSEA AND NO VOMITING
VISUAL DISTURBANCES: NOT PRESENT
HEADACHE, FULLNESS IN HEAD: NOT PRESENT
TREMOR: 2
VISUAL DISTURBANCES: NOT PRESENT
TACTILE DISTURBANCES: NOT PRESENT
AGITATION: NORMAL ACTIVITY
TACTILE DISTURBANCES: NOT PRESENT
AUDITORY DISTURBANCES: NOT PRESENT
AUDITORY DISTURBANCES: NOT PRESENT
AGITATION: NORMAL ACTIVITY
PAROXYSMAL SWEATS: NO SWEAT VISIBLE
ANXIETY: 3
AGITATION: NORMAL ACTIVITY
TREMOR: MODERATE, WITH PATIENT'S ARMS EXTENDED
TACTILE DISTURBANCES: NOT PRESENT
AUDITORY DISTURBANCES: NOT PRESENT
NAUSEA AND VOMITING: NO NAUSEA AND NO VOMITING
TACTILE DISTURBANCES: NOT PRESENT
HEADACHE, FULLNESS IN HEAD: NOT PRESENT
TREMOR: NOT VISIBLE, BUT CAN BE FELT FINGERTIP TO FINGERTIP
PAROXYSMAL SWEATS: NO SWEAT VISIBLE
AGITATION: NORMAL ACTIVITY

## 2018-03-24 LAB — MAGNESIUM SERPL-MCNC: 1.5 MG/DL (ref 1.7–2.4)

## 2018-03-24 PROCEDURE — 83735 ASSAY OF MAGNESIUM: CPT

## 2018-03-24 PROCEDURE — 10004651 HB RX, NO CHARGE ITEM: Performed by: HOSPITALIST

## 2018-03-24 PROCEDURE — 99233 SBSQ HOSP IP/OBS HIGH 50: CPT | Performed by: HOSPITALIST

## 2018-03-24 PROCEDURE — 10002807 HB RX 258: Performed by: HOSPITALIST

## 2018-03-24 PROCEDURE — 10003585 HB ROOM CHARGE INTERMEDIATE CARE

## 2018-03-24 PROCEDURE — 10002803 HB RX 637: Performed by: HOSPITALIST

## 2018-03-24 PROCEDURE — 36415 COLL VENOUS BLD VENIPUNCTURE: CPT

## 2018-03-24 PROCEDURE — 10002800 HB RX 250 W HCPCS: Performed by: HOSPITALIST

## 2018-03-24 RX ORDER — TRAMADOL HYDROCHLORIDE 50 MG/1
50 TABLET ORAL EVERY 6 HOURS PRN
Status: DISCONTINUED | OUTPATIENT
Start: 2018-03-24 | End: 2018-03-25 | Stop reason: HOSPADM

## 2018-03-24 RX ORDER — PAROXETINE HYDROCHLORIDE 20 MG/1
20 TABLET, FILM COATED ORAL DAILY
Status: DISCONTINUED | OUTPATIENT
Start: 2018-03-24 | End: 2018-03-25 | Stop reason: HOSPADM

## 2018-03-24 RX ADMIN — LORAZEPAM 2 MG: 2 INJECTION INTRAMUSCULAR; INTRAVENOUS at 18:25

## 2018-03-24 RX ADMIN — SODIUM CHLORIDE, PRESERVATIVE FREE 2 ML: 5 INJECTION INTRAVENOUS at 18:25

## 2018-03-24 RX ADMIN — HYDROCODONE BITARTRATE AND ACETAMINOPHEN 2 TABLET: 5; 325 TABLET ORAL at 20:48

## 2018-03-24 RX ADMIN — MAGNESIUM SULFATE HEPTAHYDRATE 1 G: 1 INJECTION, SOLUTION INTRAVENOUS at 05:52

## 2018-03-24 RX ADMIN — ENOXAPARIN SODIUM 40 MG: 40 INJECTION SUBCUTANEOUS at 20:51

## 2018-03-24 RX ADMIN — HYDROCODONE BITARTRATE AND ACETAMINOPHEN 2 TABLET: 5; 325 TABLET ORAL at 00:19

## 2018-03-24 RX ADMIN — FOLIC ACID 1 MG: 1 TABLET ORAL at 09:08

## 2018-03-24 RX ADMIN — TRAMADOL HYDROCHLORIDE 50 MG: 50 TABLET, FILM COATED ORAL at 09:03

## 2018-03-24 RX ADMIN — Medication 100 MG: at 09:08

## 2018-03-24 RX ADMIN — Medication 1 TABLET: at 09:08

## 2018-03-24 RX ADMIN — LIDOCAINE 1 PATCH: 246 PATCH TOPICAL at 09:08

## 2018-03-24 RX ADMIN — PANTOPRAZOLE SODIUM 40 MG: 40 TABLET, DELAYED RELEASE ORAL at 20:50

## 2018-03-24 RX ADMIN — LORAZEPAM 2 MG: 2 INJECTION INTRAMUSCULAR; INTRAVENOUS at 18:24

## 2018-03-24 RX ADMIN — SODIUM CHLORIDE: 9 INJECTION, SOLUTION INTRAVENOUS at 03:56

## 2018-03-24 RX ADMIN — SODIUM CHLORIDE, PRESERVATIVE FREE 2 ML: 5 INJECTION INTRAVENOUS at 20:55

## 2018-03-24 RX ADMIN — TRAMADOL HYDROCHLORIDE 50 MG: 50 TABLET, FILM COATED ORAL at 14:59

## 2018-03-24 RX ADMIN — PAROXETINE 20 MG: 20 TABLET, FILM COATED ORAL at 16:37

## 2018-03-24 RX ADMIN — TRAMADOL HYDROCHLORIDE 50 MG: 50 TABLET, FILM COATED ORAL at 23:58

## 2018-03-24 RX ADMIN — NICOTINE 1 PATCH: 21 PATCH TRANSDERMAL at 09:08

## 2018-03-24 ASSESSMENT — LIFESTYLE VARIABLES
NAUSEA AND VOMITING: NO NAUSEA AND NO VOMITING
PAROXYSMAL SWEATS: NO SWEAT VISIBLE
TACTILE DISTURBANCES: NOT PRESENT
AUDITORY DISTURBANCES: NOT PRESENT
ANXIETY: 3
HEADACHE, FULLNESS IN HEAD: NOT PRESENT
TACTILE DISTURBANCES: NOT PRESENT
PAROXYSMAL SWEATS: NO SWEAT VISIBLE
AGITATION: NORMAL ACTIVITY
NAUSEA AND VOMITING: NO NAUSEA AND NO VOMITING
AGITATION: NORMAL ACTIVITY
AUDITORY DISTURBANCES: NOT PRESENT
HEADACHE, FULLNESS IN HEAD: NOT PRESENT
VISUAL DISTURBANCES: NOT PRESENT
TREMOR: NO TREMOR
AUDITORY DISTURBANCES: NOT PRESENT
NAUSEA AND VOMITING: NO NAUSEA AND NO VOMITING
HEADACHE, FULLNESS IN HEAD: NOT PRESENT
TREMOR: NOT VISIBLE, BUT CAN BE FELT FINGERTIP TO FINGERTIP
ANXIETY: MILDLY ANXIOUS
TACTILE DISTURBANCES: NOT PRESENT
VISUAL DISTURBANCES: NOT PRESENT
TREMOR: NO TREMOR
AGITATION: NORMAL ACTIVITY
PAROXYSMAL SWEATS: NO SWEAT VISIBLE
VISUAL DISTURBANCES: NOT PRESENT
ANXIETY: NO ANXIETY, AT EASE

## 2018-03-24 ASSESSMENT — PAIN SCALES - GENERAL
PAIN_LEVEL_AT_REST: 8
PAIN_LEVEL_WITH_ACTIVITY: 9
PAIN_LEVEL_AT_REST: 7
PAIN_LEVEL_AT_REST: 8
PAIN_LEVEL_WITH_ACTIVITY: 7
PAIN_LEVEL_WITH_ACTIVITY: 7
PAIN_LEVEL_AT_REST: 7
PAIN_LEVEL_AT_REST: 5
PAIN_LEVEL_WITH_ACTIVITY: 7
PAIN_LEVEL_WITH_ACTIVITY: 6
PAIN_LEVEL_WITH_ACTIVITY: 8
PAIN_LEVEL_AT_REST: 7
PAIN_LEVEL_AT_REST: 4

## 2018-03-25 VITALS
RESPIRATION RATE: 16 BRPM | TEMPERATURE: 97.8 F | HEIGHT: 69 IN | BODY MASS INDEX: 28.64 KG/M2 | OXYGEN SATURATION: 92 % | SYSTOLIC BLOOD PRESSURE: 117 MMHG | WEIGHT: 193.34 LBS | DIASTOLIC BLOOD PRESSURE: 64 MMHG | HEART RATE: 77 BPM

## 2018-03-25 PROBLEM — S93.401A SPRAIN OF RIGHT ANKLE: Status: ACTIVE | Noted: 2018-03-23

## 2018-03-25 LAB — MAGNESIUM SERPL-MCNC: 1.7 MG/DL (ref 1.7–2.4)

## 2018-03-25 PROCEDURE — 10004651 HB RX, NO CHARGE ITEM: Performed by: HOSPITALIST

## 2018-03-25 PROCEDURE — 83735 ASSAY OF MAGNESIUM: CPT

## 2018-03-25 PROCEDURE — 10002807 HB RX 258: Performed by: HOSPITALIST

## 2018-03-25 PROCEDURE — 10002800 HB RX 250 W HCPCS: Performed by: HOSPITALIST

## 2018-03-25 PROCEDURE — 36415 COLL VENOUS BLD VENIPUNCTURE: CPT

## 2018-03-25 PROCEDURE — 99239 HOSP IP/OBS DSCHRG MGMT >30: CPT | Performed by: HOSPITALIST

## 2018-03-25 PROCEDURE — 10002803 HB RX 637: Performed by: HOSPITALIST

## 2018-03-25 RX ORDER — ACETAMINOPHEN 325 MG/1
650 TABLET ORAL EVERY 4 HOURS PRN
Qty: 30 TABLET | Refills: 0 | Status: ON HOLD | COMMUNITY
Start: 2018-03-25 | End: 2021-04-05 | Stop reason: ALTCHOICE

## 2018-03-25 RX ORDER — LORAZEPAM 1 MG/1
1 TABLET ORAL
Status: DISCONTINUED | OUTPATIENT
Start: 2018-03-25 | End: 2018-03-25 | Stop reason: HOSPADM

## 2018-03-25 RX ORDER — IBUPROFEN 200 MG
600 TABLET ORAL EVERY 8 HOURS PRN
Qty: 30 TABLET | Refills: 0 | Status: SHIPPED | COMMUNITY
Start: 2018-03-25

## 2018-03-25 RX ORDER — MULTIVITAMIN,THER AND MINERALS
1 TABLET ORAL DAILY
Qty: 30 TABLET | Refills: 0 | Status: ON HOLD | OUTPATIENT
Start: 2018-03-25 | End: 2021-04-05 | Stop reason: ALTCHOICE

## 2018-03-25 RX ADMIN — LIDOCAINE 1 PATCH: 246 PATCH TOPICAL at 08:00

## 2018-03-25 RX ADMIN — FOLIC ACID 1 MG: 1 TABLET ORAL at 08:00

## 2018-03-25 RX ADMIN — LORAZEPAM 1 MG: 1 TABLET ORAL at 06:46

## 2018-03-25 RX ADMIN — NICOTINE 1 PATCH: 21 PATCH TRANSDERMAL at 08:00

## 2018-03-25 RX ADMIN — HYDROCODONE BITARTRATE AND ACETAMINOPHEN 2 TABLET: 5; 325 TABLET ORAL at 03:10

## 2018-03-25 RX ADMIN — PAROXETINE 20 MG: 20 TABLET, FILM COATED ORAL at 08:00

## 2018-03-25 RX ADMIN — SODIUM CHLORIDE, PRESERVATIVE FREE 2 ML: 5 INJECTION INTRAVENOUS at 08:00

## 2018-03-25 RX ADMIN — MAGNESIUM SULFATE HEPTAHYDRATE 1 G: 1 INJECTION, SOLUTION INTRAVENOUS at 06:47

## 2018-03-25 RX ADMIN — Medication 100 MG: at 08:00

## 2018-03-25 RX ADMIN — MAGNESIUM SULFATE HEPTAHYDRATE 2 G: 40 INJECTION, SOLUTION INTRAVENOUS at 02:01

## 2018-03-25 RX ADMIN — TRAMADOL HYDROCHLORIDE 50 MG: 50 TABLET, FILM COATED ORAL at 09:19

## 2018-03-25 RX ADMIN — Medication 1 TABLET: at 08:00

## 2018-03-25 ASSESSMENT — LIFESTYLE VARIABLES
NAUSEA AND VOMITING: NO NAUSEA AND NO VOMITING
PAROXYSMAL SWEATS: NO SWEAT VISIBLE
AUDITORY DISTURBANCES: NOT PRESENT
TREMOR: NO TREMOR
TREMOR: NO TREMOR
TACTILE DISTURBANCES: NOT PRESENT
TACTILE DISTURBANCES: NOT PRESENT
ANXIETY: MILDLY ANXIOUS
AUDITORY DISTURBANCES: NOT PRESENT
PAROXYSMAL SWEATS: NO SWEAT VISIBLE
AGITATION: NORMAL ACTIVITY
ANXIETY: MILDLY ANXIOUS
HEADACHE, FULLNESS IN HEAD: MILD
VISUAL DISTURBANCES: NOT PRESENT
NAUSEA AND VOMITING: NO NAUSEA AND NO VOMITING
VISUAL DISTURBANCES: NOT PRESENT
HEADACHE, FULLNESS IN HEAD: MODERATE
AGITATION: NORMAL ACTIVITY

## 2018-03-25 ASSESSMENT — PAIN SCALES - GENERAL
PAIN_LEVEL_WITH_ACTIVITY: 7
PAIN_LEVEL_AT_REST: 4
PAIN_LEVEL_AT_REST: 5
PAIN_LEVEL_WITH_ACTIVITY: 4
PAIN_LEVEL_WITH_ACTIVITY: 4
PAIN_LEVEL_AT_REST: 7
PAIN_LEVEL_WITH_ACTIVITY: 6
PAIN_LEVEL_AT_REST: 4

## 2021-04-04 ENCOUNTER — TELEPHONE (OUTPATIENT)
Dept: SCHEDULING | Age: 36
End: 2021-04-04

## 2021-04-04 ENCOUNTER — APPOINTMENT (OUTPATIENT)
Dept: GENERAL RADIOLOGY | Age: 36
DRG: 812 | End: 2021-04-04
Attending: EMERGENCY MEDICINE

## 2021-04-04 ENCOUNTER — APPOINTMENT (OUTPATIENT)
Dept: CT IMAGING | Age: 36
DRG: 812 | End: 2021-04-04
Attending: EMERGENCY MEDICINE

## 2021-04-04 ENCOUNTER — HOSPITAL ENCOUNTER (INPATIENT)
Age: 36
LOS: 5 days | Discharge: NURSING, REHAB OR LONG-TERM CARE | DRG: 812 | End: 2021-04-09
Attending: EMERGENCY MEDICINE | Admitting: INTERNAL MEDICINE

## 2021-04-04 DIAGNOSIS — F10.10 ALCOHOL ABUSE: ICD-10-CM

## 2021-04-04 DIAGNOSIS — F32.A ANXIETY AND DEPRESSION: ICD-10-CM

## 2021-04-04 DIAGNOSIS — T48.3X4A POISONING BY DEXTROMETHORPHAN, UNDETERMINED INTENT, INITIAL ENCOUNTER: ICD-10-CM

## 2021-04-04 DIAGNOSIS — G93.40 ACUTE ENCEPHALOPATHY: ICD-10-CM

## 2021-04-04 DIAGNOSIS — M25.511 ACUTE PAIN OF RIGHT SHOULDER: ICD-10-CM

## 2021-04-04 DIAGNOSIS — T50.902A INTENTIONAL DRUG OVERDOSE, INITIAL ENCOUNTER (CMD): ICD-10-CM

## 2021-04-04 DIAGNOSIS — F22 DELUSION (CMD): ICD-10-CM

## 2021-04-04 DIAGNOSIS — F41.9 ANXIETY AND DEPRESSION: ICD-10-CM

## 2021-04-04 DIAGNOSIS — F19.10 POLYSUBSTANCE ABUSE (CMD): ICD-10-CM

## 2021-04-04 DIAGNOSIS — F10.931 ACUTE HYPERACTIVE ALCOHOL WITHDRAWAL DELIRIUM (CMD): Primary | ICD-10-CM

## 2021-04-04 DIAGNOSIS — M25.521 RIGHT ELBOW PAIN: ICD-10-CM

## 2021-04-04 DIAGNOSIS — D69.6 THROMBOCYTOPENIA (CMD): ICD-10-CM

## 2021-04-04 LAB
ALBUMIN SERPL-MCNC: 3.7 G/DL (ref 3.6–5.1)
ALBUMIN/GLOB SERPL: 0.9 {RATIO} (ref 1–2.4)
ALP SERPL-CCNC: 79 UNITS/L (ref 45–117)
ALT SERPL-CCNC: 25 UNITS/L
AMPHETAMINES UR QL SCN>500 NG/ML: NEGATIVE
ANION GAP SERPL CALC-SCNC: 14 MMOL/L (ref 10–20)
APAP SERPL-MCNC: <2 MCG/ML (ref 10–30)
APTT PPP: 22 SEC (ref 22–30)
AST SERPL-CCNC: 20 UNITS/L
BARBITURATES UR QL SCN>200 NG/ML: NEGATIVE
BASOPHILS # BLD: 0 K/MCL (ref 0–0.3)
BASOPHILS NFR BLD: 0 %
BENZODIAZ UR QL SCN>200 NG/ML: NEGATIVE
BILIRUB SERPL-MCNC: 0.5 MG/DL (ref 0.2–1)
BUN SERPL-MCNC: 8 MG/DL (ref 6–20)
BUN/CREAT SERPL: 9 (ref 7–25)
BZE UR QL SCN>150 NG/ML: NEGATIVE
CALCIUM SERPL-MCNC: 8.3 MG/DL (ref 8.4–10.2)
CANNABINOIDS UR QL SCN>50 NG/ML: POSITIVE
CHLORIDE SERPL-SCNC: 104 MMOL/L (ref 98–107)
CO2 SERPL-SCNC: 24 MMOL/L (ref 21–32)
CREAT SERPL-MCNC: 0.88 MG/DL (ref 0.67–1.17)
DEPRECATED RDW RBC: 41.1 FL (ref 39–50)
EOSINOPHIL # BLD: 0.1 K/MCL (ref 0–0.5)
EOSINOPHIL NFR BLD: 1 %
ERYTHROCYTE [DISTWIDTH] IN BLOOD: 12.5 % (ref 11–15)
ETHANOL SERPL-MCNC: NORMAL MG/DL
FASTING DURATION TIME PATIENT: ABNORMAL H
GFR SERPLBLD BASED ON 1.73 SQ M-ARVRAT: >90 ML/MIN/1.73M2
GGT SERPL-CCNC: 94 UNITS/L (ref 15–85)
GLOBULIN SER-MCNC: 4.1 G/DL (ref 2–4)
GLUCOSE SERPL-MCNC: 167 MG/DL (ref 65–99)
HCT VFR BLD CALC: 46.5 % (ref 39–51)
HGB BLD-MCNC: 15.4 G/DL (ref 13–17)
IMM GRANULOCYTES # BLD AUTO: 0 K/MCL (ref 0–0.2)
IMM GRANULOCYTES # BLD: 0 %
INR PPP: 1.2
LACTATE BLDV-SCNC: 1.3 MMOL/L (ref 0–2)
LYMPHOCYTES # BLD: 2 K/MCL (ref 1–4.8)
LYMPHOCYTES NFR BLD: 26 %
MAGNESIUM SERPL-MCNC: 1.5 MG/DL (ref 1.7–2.4)
MCH RBC QN AUTO: 29.6 PG (ref 26–34)
MCHC RBC AUTO-ENTMCNC: 33.1 G/DL (ref 32–36.5)
MCV RBC AUTO: 89.3 FL (ref 78–100)
MONOCYTES # BLD: 0.2 K/MCL (ref 0.3–0.9)
MONOCYTES NFR BLD: 3 %
NEUTROPHILS # BLD: 5.4 K/MCL (ref 1.8–7.7)
NEUTROPHILS NFR BLD: 70 %
NRBC BLD MANUAL-RTO: 0 /100 WBC
OPIATES UR QL SCN>300 NG/ML: POSITIVE
PCP UR QL SCN>25 NG/ML: NEGATIVE
PHOSPHATE SERPL-MCNC: 3 MG/DL (ref 2.4–4.7)
PHOSPHATE SERPL-MCNC: 3.2 MG/DL (ref 2.4–4.7)
PLATELET # BLD AUTO: 92 K/MCL (ref 140–450)
POTASSIUM SERPL-SCNC: 3.4 MMOL/L (ref 3.4–5.1)
POTASSIUM SERPL-SCNC: 4.1 MMOL/L (ref 3.4–5.1)
PROT SERPL-MCNC: 7.8 G/DL (ref 6.4–8.2)
PROTHROMBIN TIME: 12.3 SEC (ref 9.7–11.8)
RBC # BLD: 5.21 MIL/MCL (ref 4.5–5.9)
SALICYLATES SERPL-MCNC: 4.7 MG/DL
SARS-COV-2 RNA RESP QL NAA+PROBE: NOT DETECTED
SERVICE CMNT-IMP: NORMAL
SERVICE CMNT-IMP: NORMAL
SODIUM SERPL-SCNC: 139 MMOL/L (ref 135–145)
TROPONIN I SERPL HS-MCNC: <0.02 NG/ML
TSH SERPL-ACNC: 1.04 MCUNITS/ML (ref 0.35–5)
WBC # BLD: 7.8 K/MCL (ref 4.2–11)

## 2021-04-04 PROCEDURE — 10002803 HB RX 637: Performed by: INTERNAL MEDICINE

## 2021-04-04 PROCEDURE — 85610 PROTHROMBIN TIME: CPT | Performed by: EMERGENCY MEDICINE

## 2021-04-04 PROCEDURE — 80307 DRUG TEST PRSMV CHEM ANLYZR: CPT | Performed by: EMERGENCY MEDICINE

## 2021-04-04 PROCEDURE — 84484 ASSAY OF TROPONIN QUANT: CPT | Performed by: EMERGENCY MEDICINE

## 2021-04-04 PROCEDURE — 80143 DRUG ASSAY ACETAMINOPHEN: CPT | Performed by: EMERGENCY MEDICINE

## 2021-04-04 PROCEDURE — 96375 TX/PRO/DX INJ NEW DRUG ADDON: CPT

## 2021-04-04 PROCEDURE — 10006031 HB ROOM CHARGE TELEMETRY

## 2021-04-04 PROCEDURE — 10002807 HB RX 258: Performed by: EMERGENCY MEDICINE

## 2021-04-04 PROCEDURE — 93010 ELECTROCARDIOGRAM REPORT: CPT | Performed by: INTERNAL MEDICINE

## 2021-04-04 PROCEDURE — 83605 ASSAY OF LACTIC ACID: CPT | Performed by: EMERGENCY MEDICINE

## 2021-04-04 PROCEDURE — 84443 ASSAY THYROID STIM HORMONE: CPT | Performed by: INTERNAL MEDICINE

## 2021-04-04 PROCEDURE — 84100 ASSAY OF PHOSPHORUS: CPT | Performed by: EMERGENCY MEDICINE

## 2021-04-04 PROCEDURE — 99223 1ST HOSP IP/OBS HIGH 75: CPT | Performed by: INTERNAL MEDICINE

## 2021-04-04 PROCEDURE — 83735 ASSAY OF MAGNESIUM: CPT | Performed by: EMERGENCY MEDICINE

## 2021-04-04 PROCEDURE — 10004651 HB RX, NO CHARGE ITEM: Performed by: INTERNAL MEDICINE

## 2021-04-04 PROCEDURE — 96374 THER/PROPH/DIAG INJ IV PUSH: CPT

## 2021-04-04 PROCEDURE — 84100 ASSAY OF PHOSPHORUS: CPT | Performed by: INTERNAL MEDICINE

## 2021-04-04 PROCEDURE — 82077 ASSAY SPEC XCP UR&BREATH IA: CPT | Performed by: EMERGENCY MEDICINE

## 2021-04-04 PROCEDURE — U0003 INFECTIOUS AGENT DETECTION BY NUCLEIC ACID (DNA OR RNA); SEVERE ACUTE RESPIRATORY SYNDROME CORONAVIRUS 2 (SARS-COV-2) (CORONAVIRUS DISEASE [COVID-19]), AMPLIFIED PROBE TECHNIQUE, MAKING USE OF HIGH THROUGHPUT TECHNOLOGIES AS DESCRIBED BY CMS-2020-01-R: HCPCS | Performed by: EMERGENCY MEDICINE

## 2021-04-04 PROCEDURE — 10002800 HB RX 250 W HCPCS: Performed by: EMERGENCY MEDICINE

## 2021-04-04 PROCEDURE — 80179 DRUG ASSAY SALICYLATE: CPT | Performed by: EMERGENCY MEDICINE

## 2021-04-04 PROCEDURE — 99285 EMERGENCY DEPT VISIT HI MDM: CPT

## 2021-04-04 PROCEDURE — 85025 COMPLETE CBC W/AUTO DIFF WBC: CPT | Performed by: EMERGENCY MEDICINE

## 2021-04-04 PROCEDURE — HZ2ZZZZ DETOXIFICATION SERVICES FOR SUBSTANCE ABUSE TREATMENT: ICD-10-PCS | Performed by: EMERGENCY MEDICINE

## 2021-04-04 PROCEDURE — 10002801 HB RX 250 W/O HCPCS: Performed by: INTERNAL MEDICINE

## 2021-04-04 PROCEDURE — 85730 THROMBOPLASTIN TIME PARTIAL: CPT | Performed by: EMERGENCY MEDICINE

## 2021-04-04 PROCEDURE — 84132 ASSAY OF SERUM POTASSIUM: CPT | Performed by: INTERNAL MEDICINE

## 2021-04-04 PROCEDURE — 93005 ELECTROCARDIOGRAM TRACING: CPT | Performed by: EMERGENCY MEDICINE

## 2021-04-04 PROCEDURE — 10002800 HB RX 250 W HCPCS: Performed by: INTERNAL MEDICINE

## 2021-04-04 PROCEDURE — 80053 COMPREHEN METABOLIC PANEL: CPT | Performed by: EMERGENCY MEDICINE

## 2021-04-04 PROCEDURE — 71045 X-RAY EXAM CHEST 1 VIEW: CPT

## 2021-04-04 PROCEDURE — 82977 ASSAY OF GGT: CPT | Performed by: INTERNAL MEDICINE

## 2021-04-04 PROCEDURE — 70450 CT HEAD/BRAIN W/O DYE: CPT

## 2021-04-04 PROCEDURE — 36415 COLL VENOUS BLD VENIPUNCTURE: CPT | Performed by: INTERNAL MEDICINE

## 2021-04-04 RX ORDER — NICOTINE 21 MG/24HR
1 PATCH, TRANSDERMAL 24 HOURS TRANSDERMAL DAILY
Status: DISCONTINUED | OUTPATIENT
Start: 2021-04-04 | End: 2021-04-09 | Stop reason: HOSPADM

## 2021-04-04 RX ORDER — SODIUM CHLORIDE 450 MG/100ML
INJECTION, SOLUTION INTRAVENOUS CONTINUOUS
Status: DISCONTINUED | OUTPATIENT
Start: 2021-04-04 | End: 2021-04-06

## 2021-04-04 RX ORDER — MULTIVITAMIN,THER AND MINERALS
1 TABLET ORAL DAILY
Status: CANCELLED | OUTPATIENT
Start: 2021-04-05 | End: 2021-04-08

## 2021-04-04 RX ORDER — LORAZEPAM 2 MG/ML
2 INJECTION INTRAMUSCULAR ONCE
Status: COMPLETED | OUTPATIENT
Start: 2021-04-04 | End: 2021-04-04

## 2021-04-04 RX ORDER — POTASSIUM CHLORIDE 20 MEQ/1
40 TABLET, EXTENDED RELEASE ORAL ONCE
Status: COMPLETED | OUTPATIENT
Start: 2021-04-04 | End: 2021-04-04

## 2021-04-04 RX ORDER — MULTIVITAMIN,THER AND MINERALS
1 TABLET ORAL DAILY
Status: DISCONTINUED | OUTPATIENT
Start: 2021-04-04 | End: 2021-04-04 | Stop reason: CLARIF

## 2021-04-04 RX ORDER — LANOLIN ALCOHOL/MO/W.PET/CERES
100 CREAM (GRAM) TOPICAL DAILY
Status: CANCELLED | OUTPATIENT
Start: 2021-04-05 | End: 2021-04-08

## 2021-04-04 RX ORDER — 0.9 % SODIUM CHLORIDE 0.9 %
2 VIAL (ML) INJECTION EVERY 12 HOURS SCHEDULED
Status: DISCONTINUED | OUTPATIENT
Start: 2021-04-04 | End: 2021-04-09 | Stop reason: HOSPADM

## 2021-04-04 RX ORDER — TRAZODONE HYDROCHLORIDE 150 MG/1
150 TABLET ORAL NIGHTLY PRN
Status: ON HOLD | COMMUNITY
Start: 2021-02-27 | End: 2021-04-09 | Stop reason: SDUPTHER

## 2021-04-04 RX ORDER — LORAZEPAM 2 MG/ML
2 INJECTION INTRAMUSCULAR
Status: DISCONTINUED | OUTPATIENT
Start: 2021-04-04 | End: 2021-04-09 | Stop reason: HOSPADM

## 2021-04-04 RX ORDER — LANOLIN ALCOHOL/MO/W.PET/CERES
100 CREAM (GRAM) TOPICAL DAILY
Status: COMPLETED | OUTPATIENT
Start: 2021-04-04 | End: 2021-04-06

## 2021-04-04 RX ORDER — FOLIC ACID 1 MG/1
1 TABLET ORAL DAILY
Status: CANCELLED | OUTPATIENT
Start: 2021-04-05 | End: 2021-04-08

## 2021-04-04 RX ORDER — FOLIC ACID 1 MG/1
1 TABLET ORAL DAILY
Status: COMPLETED | OUTPATIENT
Start: 2021-04-04 | End: 2021-04-06

## 2021-04-04 RX ORDER — GABAPENTIN 300 MG/1
300 CAPSULE ORAL 3 TIMES DAILY
Status: ON HOLD | COMMUNITY
Start: 2021-02-07 | End: 2021-04-09 | Stop reason: SDUPTHER

## 2021-04-04 RX ORDER — FLUOXETINE HYDROCHLORIDE 20 MG/1
40 CAPSULE ORAL DAILY
Status: DISCONTINUED | OUTPATIENT
Start: 2021-04-05 | End: 2021-04-09 | Stop reason: HOSPADM

## 2021-04-04 RX ORDER — GABAPENTIN 300 MG/1
300 CAPSULE ORAL 3 TIMES DAILY
Status: DISCONTINUED | OUTPATIENT
Start: 2021-04-04 | End: 2021-04-09 | Stop reason: HOSPADM

## 2021-04-04 RX ORDER — ONDANSETRON 2 MG/ML
4 INJECTION INTRAMUSCULAR; INTRAVENOUS 2 TIMES DAILY PRN
Status: DISCONTINUED | OUTPATIENT
Start: 2021-04-04 | End: 2021-04-09 | Stop reason: HOSPADM

## 2021-04-04 RX ORDER — LORAZEPAM 2 MG/ML
4 INJECTION INTRAMUSCULAR
Status: CANCELLED | OUTPATIENT
Start: 2021-04-04

## 2021-04-04 RX ORDER — FLUOXETINE HYDROCHLORIDE 40 MG/1
40 CAPSULE ORAL DAILY
Status: ON HOLD | COMMUNITY
Start: 2021-02-28 | End: 2021-04-09 | Stop reason: SDUPTHER

## 2021-04-04 RX ORDER — POLYETHYLENE GLYCOL 3350 17 G/17G
17 POWDER, FOR SOLUTION ORAL DAILY PRN
Status: DISCONTINUED | OUTPATIENT
Start: 2021-04-04 | End: 2021-04-09 | Stop reason: HOSPADM

## 2021-04-04 RX ORDER — ACETAMINOPHEN 325 MG/1
650 TABLET ORAL EVERY 4 HOURS PRN
Status: DISCONTINUED | OUTPATIENT
Start: 2021-04-04 | End: 2021-04-09 | Stop reason: HOSPADM

## 2021-04-04 RX ORDER — BACLOFEN 5 MG/1
5 TABLET ORAL DAILY PRN
Status: ON HOLD | COMMUNITY
Start: 2021-02-27 | End: 2021-04-09 | Stop reason: SDUPTHER

## 2021-04-04 RX ORDER — AMOXICILLIN 250 MG
2 CAPSULE ORAL DAILY PRN
Status: DISCONTINUED | OUTPATIENT
Start: 2021-04-04 | End: 2021-04-09 | Stop reason: HOSPADM

## 2021-04-04 RX ORDER — LORAZEPAM 2 MG/ML
3 INJECTION INTRAMUSCULAR
Status: CANCELLED | OUTPATIENT
Start: 2021-04-04

## 2021-04-04 RX ORDER — LORAZEPAM 2 MG/ML
2 INJECTION INTRAMUSCULAR
Status: CANCELLED | OUTPATIENT
Start: 2021-04-04

## 2021-04-04 RX ORDER — MULTIVITAMIN,THER AND MINERALS
1 TABLET ORAL DAILY
Status: DISCONTINUED | OUTPATIENT
Start: 2021-04-05 | End: 2021-04-09 | Stop reason: HOSPADM

## 2021-04-04 RX ADMIN — Medication 1 PATCH: at 21:11

## 2021-04-04 RX ADMIN — SODIUM CHLORIDE 1000 ML: 0.9 INJECTION, SOLUTION INTRAVENOUS at 07:03

## 2021-04-04 RX ADMIN — SODIUM CHLORIDE, PRESERVATIVE FREE 2 ML: 5 INJECTION INTRAVENOUS at 14:35

## 2021-04-04 RX ADMIN — Medication 100 MG: at 14:00

## 2021-04-04 RX ADMIN — LORAZEPAM 2 MG: 2 INJECTION INTRAMUSCULAR; INTRAVENOUS at 19:53

## 2021-04-04 RX ADMIN — MAGNESIUM SULFATE HEPTAHYDRATE 2 G: 40 INJECTION, SOLUTION INTRAVENOUS at 08:41

## 2021-04-04 RX ADMIN — LORAZEPAM 2 MG: 2 INJECTION INTRAMUSCULAR; INTRAVENOUS at 07:03

## 2021-04-04 RX ADMIN — FOLIC ACID 1 MG: 1 TABLET ORAL at 14:00

## 2021-04-04 RX ADMIN — GABAPENTIN 300 MG: 300 CAPSULE ORAL at 21:11

## 2021-04-04 RX ADMIN — SODIUM CHLORIDE, PRESERVATIVE FREE 2 ML: 5 INJECTION INTRAVENOUS at 19:59

## 2021-04-04 RX ADMIN — POTASSIUM CHLORIDE 40 MEQ: 1500 TABLET, EXTENDED RELEASE ORAL at 13:44

## 2021-04-04 RX ADMIN — SODIUM CHLORIDE: 0.45 INJECTION, SOLUTION INTRAVENOUS at 13:47

## 2021-04-04 ASSESSMENT — LIFESTYLE VARIABLES
PAROXYSMAL SWEATS: BEADS OF SWEAT OBVIOUS ON FOREHEAD
PAROXYSMAL SWEATS: NO SWEAT VISIBLE
ANXIETY: NO ANXIETY, AT EASE
TACTILE DISTURBANCES: NOT PRESENT
PAROXYSMAL SWEATS: NO SWEAT VISIBLE
AGITATION: NORMAL ACTIVITY
HEADACHE, FULLNESS IN HEAD: MILD
PAROXYSMAL SWEATS: NO SWEAT VISIBLE
NAUSEA AND VOMITING: NO NAUSEA AND NO VOMITING
VISUAL DISTURBANCES: NOT PRESENT
HOW OFTEN DO YOU HAVE 6 OR MORE DRINKS ON ONE OCCASION: NEVER
VISUAL DISTURBANCES: NOT PRESENT
TREMOR: NO TREMOR
AGITATION: NORMAL ACTIVITY
AUDITORY DISTURBANCES: NOT PRESENT
VISUAL DISTURBANCES: NOT PRESENT
AUDITORY DISTURBANCES: NOT PRESENT
NAUSEA AND VOMITING: NO NAUSEA AND NO VOMITING
HOW MANY STANDARD DRINKS CONTAINING ALCOHOL DO YOU HAVE ON A TYPICAL DAY: 0,1 OR 2
NAUSEA AND VOMITING: NO NAUSEA AND NO VOMITING
VISUAL DISTURBANCES: NOT PRESENT
ANXIETY: NO ANXIETY, AT EASE
PAROXYSMAL SWEATS: NO SWEAT VISIBLE
HEADACHE, FULLNESS IN HEAD: NOT PRESENT
AUDITORY DISTURBANCES: NOT PRESENT
TREMOR: NO TREMOR
TREMOR: NO TREMOR
NAUSEA AND VOMITING: NO NAUSEA AND NO VOMITING
NAUSEA AND VOMITING: NO NAUSEA AND NO VOMITING
TACTILE DISTURBANCES: NOT PRESENT
AGITATION: NORMAL ACTIVITY
ANXIETY: NO ANXIETY, AT EASE
HEADACHE, FULLNESS IN HEAD: NOT PRESENT
TREMOR: NO TREMOR
HEADACHE, FULLNESS IN HEAD: NOT PRESENT
AGITATION: NORMAL ACTIVITY
HEADACHE, FULLNESS IN HEAD: NOT PRESENT
TREMOR: NO TREMOR
TREMOR: NO TREMOR
VISUAL DISTURBANCES: NOT PRESENT
PAROXYSMAL SWEATS: NO SWEAT VISIBLE
TACTILE DISTURBANCES: NOT PRESENT
ANXIETY: MODERATELY ANXIOUS
NAUSEA AND VOMITING: NO NAUSEA AND NO VOMITING
AGITATION: NORMAL ACTIVITY
ANXIETY: NO ANXIETY, AT EASE
AGITATION: NORMAL ACTIVITY
HEADACHE, FULLNESS IN HEAD: NOT PRESENT
PAROXYSMAL SWEATS: NO SWEAT VISIBLE
TACTILE DISTURBANCES: NOT PRESENT
AUDITORY DISTURBANCES: NOT PRESENT
AUDITORY DISTURBANCES: NOT PRESENT
TACTILE DISTURBANCES: NOT PRESENT
ANXIETY: NO ANXIETY, AT EASE
ANXIETY: NO ANXIETY, AT EASE
TACTILE DISTURBANCES: NOT PRESENT
AGITATION: NORMAL ACTIVITY
TREMOR: NO TREMOR
ANXIETY: NO ANXIETY, AT EASE
TACTILE DISTURBANCES: NOT PRESENT
NAUSEA AND VOMITING: NO NAUSEA AND NO VOMITING
TACTILE DISTURBANCES: NOT PRESENT
VISUAL DISTURBANCES: MILD
AUDITORY DISTURBANCES: NOT PRESENT
HEADACHE, FULLNESS IN HEAD: MILD
VISUAL DISTURBANCES: MODERATE
HEADACHE, FULLNESS IN HEAD: NOT PRESENT
VISUAL DISTURBANCES: NOT PRESENT
AGITATION: NORMAL ACTIVITY
PAROXYSMAL SWEATS: NO SWEAT VISIBLE
AUDITORY DISTURBANCES: NOT PRESENT
AUDITORY DISTURBANCES: NOT PRESENT
NAUSEA AND VOMITING: NO NAUSEA AND NO VOMITING
TREMOR: NO TREMOR

## 2021-04-04 ASSESSMENT — COGNITIVE AND FUNCTIONAL STATUS - GENERAL
DO YOU HAVE DIFFICULTY DRESSING OR BATHING: NO
ARE YOU BLIND OR DO YOU HAVE SERIOUS DIFFICULTY SEEING, EVEN WHEN WEARING GLASSES: NO
DO YOU HAVE SERIOUS DIFFICULTY WALKING OR CLIMBING STAIRS: NO
BECAUSE OF A PHYSICAL, MENTAL, OR EMOTIONAL CONDITION, DO YOU HAVE SERIOUS DIFFICULTY CONCENTRATING, REMEMBERING OR MAKING DECISIONS: NO
ARE YOU DEAF OR DO YOU HAVE SERIOUS DIFFICULTY  HEARING: NO
BECAUSE OF A PHYSICAL, MENTAL, OR EMOTIONAL CONDITION, DO YOU HAVE DIFFICULTY DOING ERRANDS ALONE: NO

## 2021-04-04 ASSESSMENT — PATIENT HEALTH QUESTIONNAIRE - PHQ9
SUM OF ALL RESPONSES TO PHQ9 QUESTIONS 1 AND 2: 3
SUM OF ALL RESPONSES TO PHQ9 QUESTIONS 1 AND 2: 3
8. MOVING OR SPEAKING SO SLOWLY THAT OTHER PEOPLE COULD HAVE NOTICED. OR THE OPPOSITE, BEING SO FIGETY OR RESTLESS THAT YOU HAVE BEEN MOVING AROUND A LOT MORE THAN USUAL: MORE THAN HALF THE DAYS
2. FEELING DOWN, DEPRESSED OR HOPELESS: SEVERAL DAYS
6. FEELING BAD ABOUT YOURSELF - OR THAT YOU ARE A FAILURE OR HAVE LET YOURSELF OR YOUR FAMILY DOWN: MORE THAN HALF THE DAYS
4. FEELING TIRED OR HAVING LITTLE ENERGY: MORE THAN HALF THE DAYS
1. LITTLE INTEREST OR PLEASURE IN DOING THINGS: MORE THAN HALF THE DAYS
CLINICAL INTERPRETATION OF PHQ2 SCORE: FURTHER SCREENING NEEDED
SUM OF ALL RESPONSES TO PHQ9 QUESTIONS 1 TO 9: 16
CLINICAL INTERPRETATION OF PHQ2 SCORE: FURTHER SCREENING NEEDED
IS PATIENT ABLE TO COMPLETE PHQ2 OR PHQ9: YES
7. TROUBLE CONCENTRATING ON THINGS, SUCH AS READING THE NEWSPAPER OR WATCHING TELEVISION: MORE THAN HALF THE DAYS
3. TROUBLE FALLING OR STAYING ASLEEP OR SLEEPING TOO MUCH: MORE THAN HALF THE DAYS
1. LITTLE INTEREST OR PLEASURE IN DOING THINGS: MORE THAN HALF THE DAYS
IS PATIENT ABLE TO COMPLETE PHQ2 OR PHQ9: YES
4. FEELING TIRED OR HAVING LITTLE ENERGY: MORE THAN HALF THE DAYS
5. POOR APPETITE OR OVEREATING: MORE THAN HALF THE DAYS
10. IF YOU CHECKED OFF ANY PROBLEMS, HOW DIFFICULT HAVE THESE PROBLEMS MADE IT FOR YOU TO DO YOUR WORK, TAKE CARE OF THINGS AT HOME, OR GET ALONG WITH OTHER PEOPLE: NOT DIFFICULT AT ALL
CLINICAL INTERPRETATION OF PHQ2 SCORE: MODERATELY SEVERE DEPRESSION
7. TROUBLE CONCENTRATING ON THINGS, SUCH AS READING THE NEWSPAPER OR WATCHING TELEVISION: MORE THAN HALF THE DAYS
10. IF YOU CHECKED OFF ANY PROBLEMS, HOW DIFFICULT HAVE THESE PROBLEMS MADE IT FOR YOU TO DO YOUR WORK, TAKE CARE OF THINGS AT HOME, OR GET ALONG WITH OTHER PEOPLE: NOT DIFFICULT AT ALL
9. THOUGHTS THAT YOU WOULD BE BETTER OFF DEAD, OR OF HURTING YOURSELF: SEVERAL DAYS
CLINICAL INTERPRETATION OF PHQ9 SCORE: MODERATELY SEVERE DEPRESSION
3. TROUBLE FALLING OR STAYING ASLEEP OR SLEEPING TOO MUCH: MORE THAN HALF THE DAYS
CLINICAL INTERPRETATION OF PHQ9 SCORE: FURTHER SCREENING NEEDED
5. POOR APPETITE OR OVEREATING: MORE THAN HALF THE DAYS
SUM OF ALL RESPONSES TO PHQ9 QUESTIONS 1 AND 2: 3
SUM OF ALL RESPONSES TO PHQ QUESTIONS 1-9: 16
8. MOVING OR SPEAKING SO SLOWLY THAT OTHER PEOPLE COULD HAVE NOTICED. OR THE OPPOSITE, BEING SO FIGETY OR RESTLESS THAT YOU HAVE BEEN MOVING AROUND A LOT MORE THAN USUAL: MORE THAN HALF THE DAYS
9. THOUGHTS THAT YOU WOULD BE BETTER OFF DEAD, OR OF HURTING YOURSELF: SEVERAL DAYS
2. FEELING DOWN, DEPRESSED OR HOPELESS: SEVERAL DAYS

## 2021-04-04 ASSESSMENT — COLUMBIA-SUICIDE SEVERITY RATING SCALE - C-SSRS
1. WITHIN THE PAST MONTH, HAVE YOU WISHED YOU WERE DEAD OR WISHED YOU COULD GO TO SLEEP AND NOT WAKE UP?: YES
IS THE PATIENT ABLE TO COMPLETE C-SSRS: YES
6. HAVE YOU EVER DONE ANYTHING, STARTED TO DO ANYTHING, OR PREPARED TO DO ANYTHING TO END YOUR LIFE?: NO
2. HAVE YOU ACTUALLY HAD ANY THOUGHTS OF KILLING YOURSELF?: NO

## 2021-04-04 ASSESSMENT — ACTIVITIES OF DAILY LIVING (ADL)
CHRONIC_PAIN_PRESENT: NO
ADL_SCORE: 12
ADL_BEFORE_ADMISSION: INDEPENDENT

## 2021-04-04 ASSESSMENT — PAIN SCALES - GENERAL
PAINLEVEL_OUTOF10: 0
PAINLEVEL_OUTOF10: 0

## 2021-04-04 ASSESSMENT — MOVEMENT AND STRENGTH ASSESSMENTS
FACE_JAW: FACE SYMMETRICAL;TONGUE MIDLINE;FULL RANGE OF MOTION
ALL_EXTREMITIES: EQUAL STRENGTH/TONE/MOVEMENT

## 2021-04-05 ENCOUNTER — APPOINTMENT (OUTPATIENT)
Dept: GENERAL RADIOLOGY | Age: 36
DRG: 812 | End: 2021-04-05
Attending: INTERNAL MEDICINE

## 2021-04-05 LAB
ALBUMIN SERPL-MCNC: 3.4 G/DL (ref 3.6–5.1)
ALBUMIN/GLOB SERPL: 0.9 {RATIO} (ref 1–2.4)
ALP SERPL-CCNC: 80 UNITS/L (ref 45–117)
ALT SERPL-CCNC: 21 UNITS/L
ANION GAP SERPL CALC-SCNC: 14 MMOL/L (ref 10–20)
AST SERPL-CCNC: 14 UNITS/L
BASOPHILS # BLD: 0 K/MCL (ref 0–0.3)
BASOPHILS NFR BLD: 0 %
BILIRUB SERPL-MCNC: 0.6 MG/DL (ref 0.2–1)
BUN SERPL-MCNC: 8 MG/DL (ref 6–20)
BUN/CREAT SERPL: 10 (ref 7–25)
CALCIUM SERPL-MCNC: 8.8 MG/DL (ref 8.4–10.2)
CHLORIDE SERPL-SCNC: 103 MMOL/L (ref 98–107)
CO2 SERPL-SCNC: 24 MMOL/L (ref 21–32)
CREAT SERPL-MCNC: 0.8 MG/DL (ref 0.67–1.17)
DEPRECATED RDW RBC: 41.4 FL (ref 39–50)
EOSINOPHIL # BLD: 0.2 K/MCL (ref 0–0.5)
EOSINOPHIL NFR BLD: 2 %
ERYTHROCYTE [DISTWIDTH] IN BLOOD: 12.6 % (ref 11–15)
FASTING DURATION TIME PATIENT: ABNORMAL H
GFR SERPLBLD BASED ON 1.73 SQ M-ARVRAT: >90 ML/MIN/1.73M2
GLOBULIN SER-MCNC: 3.9 G/DL (ref 2–4)
GLUCOSE SERPL-MCNC: 117 MG/DL (ref 65–99)
HCT VFR BLD CALC: 43.2 % (ref 39–51)
HGB BLD-MCNC: 14.5 G/DL (ref 13–17)
IMM GRANULOCYTES # BLD AUTO: 0 K/MCL (ref 0–0.2)
IMM GRANULOCYTES # BLD: 0 %
LYMPHOCYTES # BLD: 2.3 K/MCL (ref 1–4.8)
LYMPHOCYTES NFR BLD: 24 %
MAGNESIUM SERPL-MCNC: 1.7 MG/DL (ref 1.7–2.4)
MCH RBC QN AUTO: 30.2 PG (ref 26–34)
MCHC RBC AUTO-ENTMCNC: 33.6 G/DL (ref 32–36.5)
MCV RBC AUTO: 90 FL (ref 78–100)
MONOCYTES # BLD: 0.4 K/MCL (ref 0.3–0.9)
MONOCYTES NFR BLD: 4 %
NEUTROPHILS # BLD: 6.8 K/MCL (ref 1.8–7.7)
NEUTROPHILS NFR BLD: 70 %
NRBC BLD MANUAL-RTO: 0 /100 WBC
PHOSPHATE SERPL-MCNC: 3.7 MG/DL (ref 2.4–4.7)
PLATELET # BLD AUTO: 83 K/MCL (ref 140–450)
POTASSIUM SERPL-SCNC: 3.9 MMOL/L (ref 3.4–5.1)
PROT SERPL-MCNC: 7.3 G/DL (ref 6.4–8.2)
RAINBOW EXTRA TUBES HOLD SPECIMEN: NORMAL
RBC # BLD: 4.8 MIL/MCL (ref 4.5–5.9)
SODIUM SERPL-SCNC: 137 MMOL/L (ref 135–145)
WBC # BLD: 9.6 K/MCL (ref 4.2–11)

## 2021-04-05 PROCEDURE — 10002800 HB RX 250 W HCPCS: Performed by: INTERNAL MEDICINE

## 2021-04-05 PROCEDURE — 80053 COMPREHEN METABOLIC PANEL: CPT | Performed by: INTERNAL MEDICINE

## 2021-04-05 PROCEDURE — 36415 COLL VENOUS BLD VENIPUNCTURE: CPT | Performed by: INTERNAL MEDICINE

## 2021-04-05 PROCEDURE — 10006031 HB ROOM CHARGE TELEMETRY

## 2021-04-05 PROCEDURE — 10004651 HB RX, NO CHARGE ITEM: Performed by: INTERNAL MEDICINE

## 2021-04-05 PROCEDURE — 10002801 HB RX 250 W/O HCPCS: Performed by: INTERNAL MEDICINE

## 2021-04-05 PROCEDURE — 83735 ASSAY OF MAGNESIUM: CPT | Performed by: INTERNAL MEDICINE

## 2021-04-05 PROCEDURE — 10002803 HB RX 637: Performed by: HOSPITALIST

## 2021-04-05 PROCEDURE — 99233 SBSQ HOSP IP/OBS HIGH 50: CPT | Performed by: INTERNAL MEDICINE

## 2021-04-05 PROCEDURE — 84100 ASSAY OF PHOSPHORUS: CPT | Performed by: INTERNAL MEDICINE

## 2021-04-05 PROCEDURE — 73030 X-RAY EXAM OF SHOULDER: CPT

## 2021-04-05 PROCEDURE — G0426 INPT/ED TELECONSULT50: HCPCS | Performed by: NURSE PRACTITIONER

## 2021-04-05 PROCEDURE — 10002803 HB RX 637: Performed by: INTERNAL MEDICINE

## 2021-04-05 PROCEDURE — 10000002 HB ROOM CHARGE MED SURG

## 2021-04-05 PROCEDURE — 73080 X-RAY EXAM OF ELBOW: CPT

## 2021-04-05 PROCEDURE — 85025 COMPLETE CBC W/AUTO DIFF WBC: CPT | Performed by: INTERNAL MEDICINE

## 2021-04-05 RX ORDER — LANOLIN ALCOHOL/MO/W.PET/CERES
400 CREAM (GRAM) TOPICAL ONCE
Status: COMPLETED | OUTPATIENT
Start: 2021-04-05 | End: 2021-04-05

## 2021-04-05 RX ORDER — POTASSIUM CHLORIDE 20 MEQ/1
40 TABLET, EXTENDED RELEASE ORAL ONCE
Status: COMPLETED | OUTPATIENT
Start: 2021-04-05 | End: 2021-04-05

## 2021-04-05 RX ORDER — HYDROCODONE BITARTRATE AND ACETAMINOPHEN 5; 325 MG/1; MG/1
1 TABLET ORAL ONCE
Status: COMPLETED | OUTPATIENT
Start: 2021-04-05 | End: 2021-04-05

## 2021-04-05 RX ADMIN — GABAPENTIN 300 MG: 300 CAPSULE ORAL at 21:51

## 2021-04-05 RX ADMIN — TRAZODONE HYDROCHLORIDE 150 MG: 100 TABLET ORAL at 01:08

## 2021-04-05 RX ADMIN — LORAZEPAM 2 MG: 2 INJECTION INTRAMUSCULAR; INTRAVENOUS at 00:33

## 2021-04-05 RX ADMIN — MAGNESIUM OXIDE TAB 400 MG (240 MG ELEMENTAL MG) 400 MG: 400 (240 MG) TAB at 16:56

## 2021-04-05 RX ADMIN — GABAPENTIN 300 MG: 300 CAPSULE ORAL at 13:42

## 2021-04-05 RX ADMIN — KETOROLAC TROMETHAMINE 30 MG: 30 INJECTION, SOLUTION INTRAMUSCULAR; INTRAVENOUS at 12:11

## 2021-04-05 RX ADMIN — SODIUM CHLORIDE, PRESERVATIVE FREE 2 ML: 5 INJECTION INTRAVENOUS at 23:48

## 2021-04-05 RX ADMIN — BACLOFEN 5 MG: 10 TABLET ORAL at 08:29

## 2021-04-05 RX ADMIN — FOLIC ACID 1 MG: 1 TABLET ORAL at 08:21

## 2021-04-05 RX ADMIN — GABAPENTIN 300 MG: 300 CAPSULE ORAL at 08:21

## 2021-04-05 RX ADMIN — Medication 1 PATCH: at 08:22

## 2021-04-05 RX ADMIN — Medication 100 MG: at 08:29

## 2021-04-05 RX ADMIN — SODIUM CHLORIDE: 0.45 INJECTION, SOLUTION INTRAVENOUS at 08:39

## 2021-04-05 RX ADMIN — ACETAMINOPHEN 650 MG: 325 TABLET ORAL at 21:53

## 2021-04-05 RX ADMIN — SODIUM CHLORIDE, PRESERVATIVE FREE 2 ML: 5 INJECTION INTRAVENOUS at 08:36

## 2021-04-05 RX ADMIN — MULTIPLE VITAMINS W/ MINERALS TAB 1 TABLET: TAB at 08:22

## 2021-04-05 RX ADMIN — FLUOXETINE 40 MG: 20 CAPSULE ORAL at 08:22

## 2021-04-05 RX ADMIN — ACETAMINOPHEN 650 MG: 325 TABLET ORAL at 06:53

## 2021-04-05 RX ADMIN — POTASSIUM CHLORIDE 40 MEQ: 1500 TABLET, EXTENDED RELEASE ORAL at 08:22

## 2021-04-05 RX ADMIN — ACETAMINOPHEN 650 MG: 325 TABLET ORAL at 16:56

## 2021-04-05 RX ADMIN — HYDROCODONE BITARTRATE AND ACETAMINOPHEN 1 TABLET: 5; 325 TABLET ORAL at 23:47

## 2021-04-05 RX ADMIN — MAGNESIUM OXIDE TAB 400 MG (240 MG ELEMENTAL MG) 400 MG: 400 (240 MG) TAB at 08:22

## 2021-04-05 ASSESSMENT — PAIN SCALES - GENERAL
PAINLEVEL_OUTOF10: 5
PAINLEVEL_OUTOF10: 7
PAINLEVEL_OUTOF10: 4
PAINLEVEL_OUTOF10: 6
PAINLEVEL_OUTOF10: 7
PAINLEVEL_OUTOF10: 8

## 2021-04-05 ASSESSMENT — LIFESTYLE VARIABLES
AGITATION: MODERATELY FIDGETY AND RESTLESS
AUDITORY DISTURBANCES: NOT PRESENT
ANXIETY: NO ANXIETY, AT EASE
TREMOR: NO TREMOR
TREMOR: NO TREMOR
HEADACHE, FULLNESS IN HEAD: NOT PRESENT
ANXIETY: MILDLY ANXIOUS
HEADACHE, FULLNESS IN HEAD: NOT PRESENT
NAUSEA AND VOMITING: NO NAUSEA AND NO VOMITING
VISUAL DISTURBANCES: NOT PRESENT
ANXIETY: NO ANXIETY, AT EASE
ANXIETY: NO ANXIETY, AT EASE
TREMOR: NO TREMOR
PAROXYSMAL SWEATS: BARELY PERCEPTIBLE SWEATING, PALMS MOIST
TACTILE DISTURBANCES: NOT PRESENT
TACTILE DISTURBANCES: NOT PRESENT
AUDITORY DISTURBANCES: NOT PRESENT
AUDITORY DISTURBANCES: NOT PRESENT
AGITATION: NORMAL ACTIVITY
PAROXYSMAL SWEATS: NO SWEAT VISIBLE
TREMOR: NO TREMOR
VISUAL DISTURBANCES: NOT PRESENT
TREMOR: NO TREMOR
PAROXYSMAL SWEATS: NO SWEAT VISIBLE
TREMOR: NO TREMOR
NAUSEA AND VOMITING: NO NAUSEA AND NO VOMITING
AGITATION: NORMAL ACTIVITY
PAROXYSMAL SWEATS: NO SWEAT VISIBLE
VISUAL DISTURBANCES: MILD
AUDITORY DISTURBANCES: NOT PRESENT
PAROXYSMAL SWEATS: NO SWEAT VISIBLE
VISUAL DISTURBANCES: NOT PRESENT
VISUAL DISTURBANCES: NOT PRESENT
AUDITORY DISTURBANCES: NOT PRESENT
VISUAL DISTURBANCES: NOT PRESENT
AUDITORY DISTURBANCES: NOT PRESENT
TREMOR: NO TREMOR
PAROXYSMAL SWEATS: NO SWEAT VISIBLE
PAROXYSMAL SWEATS: NO SWEAT VISIBLE
VISUAL DISTURBANCES: NOT PRESENT
TACTILE DISTURBANCES: NOT PRESENT
VISUAL DISTURBANCES: NOT PRESENT
ANXIETY: NO ANXIETY, AT EASE
HEADACHE, FULLNESS IN HEAD: NOT PRESENT
TACTILE DISTURBANCES: NOT PRESENT
NAUSEA AND VOMITING: NO NAUSEA AND NO VOMITING
TACTILE DISTURBANCES: NOT PRESENT
TACTILE DISTURBANCES: NOT PRESENT
HEADACHE, FULLNESS IN HEAD: NOT PRESENT
TACTILE DISTURBANCES: NOT PRESENT
NAUSEA AND VOMITING: NO NAUSEA AND NO VOMITING
AGITATION: NORMAL ACTIVITY
VISUAL DISTURBANCES: NOT PRESENT
AGITATION: NORMAL ACTIVITY
AUDITORY DISTURBANCES: NOT PRESENT
HEADACHE, FULLNESS IN HEAD: MODERATE
PAROXYSMAL SWEATS: NO SWEAT VISIBLE
TACTILE DISTURBANCES: NOT PRESENT
TREMOR: NO TREMOR
ANXIETY: MODERATELY ANXIOUS
TREMOR: NO TREMOR
ANXIETY: NO ANXIETY, AT EASE
AGITATION: MODERATELY FIDGETY AND RESTLESS
HEADACHE, FULLNESS IN HEAD: MODERATE
AGITATION: NORMAL ACTIVITY
NAUSEA AND VOMITING: NO NAUSEA AND NO VOMITING
ANXIETY: NO ANXIETY, AT EASE
HEADACHE, FULLNESS IN HEAD: NOT PRESENT
NAUSEA AND VOMITING: NO NAUSEA AND NO VOMITING
AGITATION: NORMAL ACTIVITY
AUDITORY DISTURBANCES: NOT PRESENT
PAROXYSMAL SWEATS: NO SWEAT VISIBLE
AUDITORY DISTURBANCES: NOT PRESENT
HEADACHE, FULLNESS IN HEAD: NOT PRESENT
AGITATION: NORMAL ACTIVITY
TACTILE DISTURBANCES: NOT PRESENT
ANXIETY: NO ANXIETY, AT EASE
NAUSEA AND VOMITING: NO NAUSEA AND NO VOMITING
HEADACHE, FULLNESS IN HEAD: NOT PRESENT

## 2021-04-06 LAB
ANION GAP SERPL CALC-SCNC: 17 MMOL/L (ref 10–20)
BUN SERPL-MCNC: 8 MG/DL (ref 6–20)
BUN/CREAT SERPL: 9 (ref 7–25)
CALCIUM SERPL-MCNC: 8.6 MG/DL (ref 8.4–10.2)
CHLORIDE SERPL-SCNC: 103 MMOL/L (ref 98–107)
CO2 SERPL-SCNC: 22 MMOL/L (ref 21–32)
CREAT SERPL-MCNC: 0.88 MG/DL (ref 0.67–1.17)
DEPRECATED RDW RBC: 41.3 FL (ref 39–50)
ERYTHROCYTE [DISTWIDTH] IN BLOOD: 12.7 % (ref 11–15)
FASTING DURATION TIME PATIENT: ABNORMAL H
GFR SERPLBLD BASED ON 1.73 SQ M-ARVRAT: >90 ML/MIN/1.73M2
GLUCOSE SERPL-MCNC: 110 MG/DL (ref 65–99)
HCT VFR BLD CALC: 41.5 % (ref 39–51)
HGB BLD-MCNC: 14.3 G/DL (ref 13–17)
MAGNESIUM SERPL-MCNC: 1.8 MG/DL (ref 1.7–2.4)
MCH RBC QN AUTO: 30.7 PG (ref 26–34)
MCHC RBC AUTO-ENTMCNC: 34.5 G/DL (ref 32–36.5)
MCV RBC AUTO: 89.1 FL (ref 78–100)
NRBC BLD MANUAL-RTO: 0 /100 WBC
P AXIS (DEGREES): 52
PLATELET # BLD AUTO: 82 K/MCL (ref 140–450)
POTASSIUM SERPL-SCNC: 4.2 MMOL/L (ref 3.4–5.1)
POTASSIUM SERPL-SCNC: 4.2 MMOL/L (ref 3.4–5.1)
PR-INTERVAL (MSEC): 160
QRS-INTERVAL (MSEC): 89
QT-INTERVAL (MSEC): 337
QTC: 397
R AXIS (DEGREES): 23
RAINBOW EXTRA TUBES HOLD SPECIMEN: NORMAL
RBC # BLD: 4.66 MIL/MCL (ref 4.5–5.9)
REPORT TEXT: NORMAL
SODIUM SERPL-SCNC: 138 MMOL/L (ref 135–145)
T AXIS (DEGREES): 39
VENTRICULAR RATE EKG/MIN (BPM): 103
WBC # BLD: 6.3 K/MCL (ref 4.2–11)

## 2021-04-06 PROCEDURE — 10002800 HB RX 250 W HCPCS: Performed by: HOSPITALIST

## 2021-04-06 PROCEDURE — 10006031 HB ROOM CHARGE TELEMETRY

## 2021-04-06 PROCEDURE — 99233 SBSQ HOSP IP/OBS HIGH 50: CPT | Performed by: HOSPITALIST

## 2021-04-06 PROCEDURE — 36415 COLL VENOUS BLD VENIPUNCTURE: CPT | Performed by: INTERNAL MEDICINE

## 2021-04-06 PROCEDURE — 84132 ASSAY OF SERUM POTASSIUM: CPT | Performed by: INTERNAL MEDICINE

## 2021-04-06 PROCEDURE — 85027 COMPLETE CBC AUTOMATED: CPT | Performed by: INTERNAL MEDICINE

## 2021-04-06 PROCEDURE — 83735 ASSAY OF MAGNESIUM: CPT | Performed by: INTERNAL MEDICINE

## 2021-04-06 PROCEDURE — 10002800 HB RX 250 W HCPCS: Performed by: INTERNAL MEDICINE

## 2021-04-06 PROCEDURE — 10004651 HB RX, NO CHARGE ITEM: Performed by: INTERNAL MEDICINE

## 2021-04-06 PROCEDURE — 80048 BASIC METABOLIC PNL TOTAL CA: CPT | Performed by: INTERNAL MEDICINE

## 2021-04-06 PROCEDURE — 10002803 HB RX 637: Performed by: INTERNAL MEDICINE

## 2021-04-06 PROCEDURE — 10000002 HB ROOM CHARGE MED SURG

## 2021-04-06 RX ORDER — HEPARIN SODIUM 5000 [USP'U]/ML
5000 INJECTION, SOLUTION INTRAVENOUS; SUBCUTANEOUS EVERY 8 HOURS SCHEDULED
Status: DISCONTINUED | OUTPATIENT
Start: 2021-04-06 | End: 2021-04-09 | Stop reason: HOSPADM

## 2021-04-06 RX ORDER — HYDROCODONE BITARTRATE AND ACETAMINOPHEN 5; 325 MG/1; MG/1
1 TABLET ORAL EVERY 6 HOURS PRN
Status: DISCONTINUED | OUTPATIENT
Start: 2021-04-06 | End: 2021-04-09 | Stop reason: HOSPADM

## 2021-04-06 RX ADMIN — HYDROCODONE BITARTRATE AND ACETAMINOPHEN 1 TABLET: 5; 325 TABLET ORAL at 05:47

## 2021-04-06 RX ADMIN — SODIUM CHLORIDE, PRESERVATIVE FREE 2 ML: 5 INJECTION INTRAVENOUS at 09:27

## 2021-04-06 RX ADMIN — GABAPENTIN 300 MG: 300 CAPSULE ORAL at 16:00

## 2021-04-06 RX ADMIN — GABAPENTIN 300 MG: 300 CAPSULE ORAL at 09:22

## 2021-04-06 RX ADMIN — SODIUM CHLORIDE, PRESERVATIVE FREE 2 ML: 5 INJECTION INTRAVENOUS at 21:00

## 2021-04-06 RX ADMIN — BACLOFEN 5 MG: 10 TABLET ORAL at 09:22

## 2021-04-06 RX ADMIN — HYDROCODONE BITARTRATE AND ACETAMINOPHEN 1 TABLET: 5; 325 TABLET ORAL at 11:39

## 2021-04-06 RX ADMIN — Medication 100 MG: at 09:26

## 2021-04-06 RX ADMIN — HEPARIN SODIUM 5000 UNITS: 5000 INJECTION INTRAVENOUS; SUBCUTANEOUS at 22:59

## 2021-04-06 RX ADMIN — Medication 1 PATCH: at 09:23

## 2021-04-06 RX ADMIN — MULTIPLE VITAMINS W/ MINERALS TAB 1 TABLET: TAB at 09:22

## 2021-04-06 RX ADMIN — FOLIC ACID 1 MG: 1 TABLET ORAL at 09:22

## 2021-04-06 RX ADMIN — HYDROCODONE BITARTRATE AND ACETAMINOPHEN 1 TABLET: 5; 325 TABLET ORAL at 20:13

## 2021-04-06 RX ADMIN — KETOROLAC TROMETHAMINE 15 MG: 15 INJECTION, SOLUTION INTRAMUSCULAR; INTRAVENOUS at 17:33

## 2021-04-06 RX ADMIN — FLUOXETINE 40 MG: 20 CAPSULE ORAL at 09:22

## 2021-04-06 RX ADMIN — GABAPENTIN 300 MG: 300 CAPSULE ORAL at 20:13

## 2021-04-06 ASSESSMENT — LIFESTYLE VARIABLES
HEADACHE, FULLNESS IN HEAD: NOT PRESENT
TACTILE DISTURBANCES: NOT PRESENT
TREMOR: NO TREMOR
VISUAL DISTURBANCES: NOT PRESENT
VISUAL DISTURBANCES: NOT PRESENT
AUDITORY DISTURBANCES: NOT PRESENT
ANXIETY: NO ANXIETY, AT EASE
NAUSEA AND VOMITING: NO NAUSEA AND NO VOMITING
TREMOR: NO TREMOR
ANXIETY: NO ANXIETY, AT EASE
ANXIETY: NO ANXIETY, AT EASE
TREMOR: NO TREMOR
TACTILE DISTURBANCES: NOT PRESENT
NAUSEA AND VOMITING: NO NAUSEA AND NO VOMITING
PAROXYSMAL SWEATS: NO SWEAT VISIBLE
PAROXYSMAL SWEATS: NO SWEAT VISIBLE
HEADACHE, FULLNESS IN HEAD: NOT PRESENT
ANXIETY: NO ANXIETY, AT EASE
AGITATION: NORMAL ACTIVITY
VISUAL DISTURBANCES: NOT PRESENT
NAUSEA AND VOMITING: NO NAUSEA AND NO VOMITING
TACTILE DISTURBANCES: NOT PRESENT
PAROXYSMAL SWEATS: NO SWEAT VISIBLE
AGITATION: NORMAL ACTIVITY
AUDITORY DISTURBANCES: NOT PRESENT
AUDITORY DISTURBANCES: NOT PRESENT
TACTILE DISTURBANCES: NOT PRESENT
AUDITORY DISTURBANCES: NOT PRESENT
AGITATION: NORMAL ACTIVITY
TREMOR: NO TREMOR
TREMOR: NOT VISIBLE, BUT CAN BE FELT FINGERTIP TO FINGERTIP
AGITATION: NORMAL ACTIVITY
HEADACHE, FULLNESS IN HEAD: NOT PRESENT
AUDITORY DISTURBANCES: NOT PRESENT
HEADACHE, FULLNESS IN HEAD: NOT PRESENT
NAUSEA AND VOMITING: NO NAUSEA AND NO VOMITING
NAUSEA AND VOMITING: NO NAUSEA AND NO VOMITING
AGITATION: NORMAL ACTIVITY
ANXIETY: MILDLY ANXIOUS
HEADACHE, FULLNESS IN HEAD: NOT PRESENT
VISUAL DISTURBANCES: NOT PRESENT
VISUAL DISTURBANCES: NOT PRESENT
PAROXYSMAL SWEATS: BARELY PERCEPTIBLE SWEATING, PALMS MOIST
PAROXYSMAL SWEATS: NO SWEAT VISIBLE
TACTILE DISTURBANCES: NOT PRESENT

## 2021-04-06 ASSESSMENT — PAIN SCALES - GENERAL
PAINLEVEL_OUTOF10: 3
PAINLEVEL_OUTOF10: 5
PAINLEVEL_OUTOF10: 7
PAINLEVEL_OUTOF10: 6
PAINLEVEL_OUTOF10: 4
PAINLEVEL_OUTOF10: 8

## 2021-04-07 PROCEDURE — 10002803 HB RX 637: Performed by: INTERNAL MEDICINE

## 2021-04-07 PROCEDURE — 99233 SBSQ HOSP IP/OBS HIGH 50: CPT | Performed by: HOSPITALIST

## 2021-04-07 PROCEDURE — 10002800 HB RX 250 W HCPCS: Performed by: INTERNAL MEDICINE

## 2021-04-07 PROCEDURE — 10000002 HB ROOM CHARGE MED SURG

## 2021-04-07 PROCEDURE — 10002800 HB RX 250 W HCPCS: Performed by: HOSPITALIST

## 2021-04-07 PROCEDURE — 10004651 HB RX, NO CHARGE ITEM: Performed by: INTERNAL MEDICINE

## 2021-04-07 RX ORDER — LORAZEPAM 2 MG/ML
0.5 INJECTION INTRAMUSCULAR ONCE
Status: COMPLETED | OUTPATIENT
Start: 2021-04-07 | End: 2021-04-08

## 2021-04-07 RX ADMIN — TRAZODONE HYDROCHLORIDE 150 MG: 100 TABLET ORAL at 22:34

## 2021-04-07 RX ADMIN — GABAPENTIN 300 MG: 300 CAPSULE ORAL at 20:28

## 2021-04-07 RX ADMIN — SODIUM CHLORIDE, PRESERVATIVE FREE 2 ML: 5 INJECTION INTRAVENOUS at 09:12

## 2021-04-07 RX ADMIN — HEPARIN SODIUM 5000 UNITS: 5000 INJECTION INTRAVENOUS; SUBCUTANEOUS at 21:00

## 2021-04-07 RX ADMIN — FLUOXETINE 40 MG: 20 CAPSULE ORAL at 08:38

## 2021-04-07 RX ADMIN — GABAPENTIN 300 MG: 300 CAPSULE ORAL at 08:38

## 2021-04-07 RX ADMIN — HYDROCODONE BITARTRATE AND ACETAMINOPHEN 1 TABLET: 5; 325 TABLET ORAL at 20:27

## 2021-04-07 RX ADMIN — Medication 1 PATCH: at 08:40

## 2021-04-07 RX ADMIN — HEPARIN SODIUM 5000 UNITS: 5000 INJECTION INTRAVENOUS; SUBCUTANEOUS at 14:00

## 2021-04-07 RX ADMIN — KETOROLAC TROMETHAMINE 15 MG: 15 INJECTION, SOLUTION INTRAMUSCULAR; INTRAVENOUS at 00:15

## 2021-04-07 RX ADMIN — BACLOFEN 5 MG: 10 TABLET ORAL at 08:42

## 2021-04-07 RX ADMIN — LORAZEPAM 2 MG: 2 INJECTION INTRAMUSCULAR; INTRAVENOUS at 01:36

## 2021-04-07 RX ADMIN — KETOROLAC TROMETHAMINE 15 MG: 15 INJECTION, SOLUTION INTRAMUSCULAR; INTRAVENOUS at 05:26

## 2021-04-07 RX ADMIN — HYDROCODONE BITARTRATE AND ACETAMINOPHEN 1 TABLET: 5; 325 TABLET ORAL at 03:14

## 2021-04-07 RX ADMIN — HEPARIN SODIUM 5000 UNITS: 5000 INJECTION INTRAVENOUS; SUBCUTANEOUS at 05:26

## 2021-04-07 RX ADMIN — GABAPENTIN 300 MG: 300 CAPSULE ORAL at 14:00

## 2021-04-07 RX ADMIN — SODIUM CHLORIDE, PRESERVATIVE FREE 2 ML: 5 INJECTION INTRAVENOUS at 21:00

## 2021-04-07 RX ADMIN — TRAZODONE HYDROCHLORIDE 150 MG: 100 TABLET ORAL at 01:36

## 2021-04-07 RX ADMIN — MULTIPLE VITAMINS W/ MINERALS TAB 1 TABLET: TAB at 08:38

## 2021-04-07 RX ADMIN — KETOROLAC TROMETHAMINE 15 MG: 15 INJECTION, SOLUTION INTRAMUSCULAR; INTRAVENOUS at 11:42

## 2021-04-07 RX ADMIN — KETOROLAC TROMETHAMINE 15 MG: 15 INJECTION, SOLUTION INTRAMUSCULAR; INTRAVENOUS at 17:55

## 2021-04-07 ASSESSMENT — ACTIVITIES OF DAILY LIVING (ADL)
ADL_BEFORE_ADMISSION: INDEPENDENT
CHRONIC_PAIN_PRESENT: NO
ADL_SHORT_OF_BREATH: NO
RECENT_DECLINE_ADL: NO
ADL_SCORE: 12

## 2021-04-07 ASSESSMENT — PAIN SCALES - GENERAL
PAINLEVEL_OUTOF10: 3
PAINLEVEL_OUTOF10: 5
PAINLEVEL_OUTOF10: 4
PAINLEVEL_OUTOF10: 5
PAINLEVEL_OUTOF10: 6
PAINLEVEL_OUTOF10: 5
PAINLEVEL_OUTOF10: 6
PAINLEVEL_OUTOF10: 6

## 2021-04-07 ASSESSMENT — LIFESTYLE VARIABLES
PAROXYSMAL SWEATS: NO SWEAT VISIBLE
ANXIETY: MODERATELY ANXIOUS
NAUSEA AND VOMITING: NO NAUSEA AND NO VOMITING
VISUAL DISTURBANCES: NOT PRESENT
PAROXYSMAL SWEATS: BARELY PERCEPTIBLE SWEATING, PALMS MOIST
PAROXYSMAL SWEATS: BARELY PERCEPTIBLE SWEATING, PALMS MOIST
ANXIETY: NO ANXIETY, AT EASE
PAROXYSMAL SWEATS: BARELY PERCEPTIBLE SWEATING, PALMS MOIST
AGITATION: NORMAL ACTIVITY
ANXIETY: MILDLY ANXIOUS
ANXIETY: MILDLY ANXIOUS
TACTILE DISTURBANCES: NOT PRESENT
AUDITORY DISTURBANCES: NOT PRESENT
NAUSEA AND VOMITING: NO NAUSEA AND NO VOMITING
HEADACHE, FULLNESS IN HEAD: NOT PRESENT
TREMOR: MODERATE, WITH PATIENT'S ARMS EXTENDED
AGITATION: NORMAL ACTIVITY
AUDITORY DISTURBANCES: NOT PRESENT
AGITATION: NORMAL ACTIVITY
ALCOHOL_USE_STATUS: NO OR LOW RISK WITH VALIDATED TOOL
AUDITORY DISTURBANCES: NOT PRESENT
HEADACHE, FULLNESS IN HEAD: NOT PRESENT
ANXIETY: MILDLY ANXIOUS
AGITATION: SOMEWHAT MORE THAN NORMAL ACTIVITY
VISUAL DISTURBANCES: NOT PRESENT
TREMOR: NOT VISIBLE, BUT CAN BE FELT FINGERTIP TO FINGERTIP
TREMOR: 2
VISUAL DISTURBANCES: NOT PRESENT
PAROXYSMAL SWEATS: BARELY PERCEPTIBLE SWEATING, PALMS MOIST
TACTILE DISTURBANCES: NOT PRESENT
NAUSEA AND VOMITING: NO NAUSEA AND NO VOMITING
TREMOR: 2
TREMOR: NOT VISIBLE, BUT CAN BE FELT FINGERTIP TO FINGERTIP
AGITATION: NORMAL ACTIVITY
HOW OFTEN DO YOU HAVE 6 OR MORE DRINKS ON ONE OCCASION: NEVER
AUDITORY DISTURBANCES: NOT PRESENT
VISUAL DISTURBANCES: NOT PRESENT
HOW OFTEN DO YOU HAVE A DRINK CONTAINING ALCOHOL: NEVER
HEADACHE, FULLNESS IN HEAD: NOT PRESENT
TACTILE DISTURBANCES: NOT PRESENT
NAUSEA AND VOMITING: NO NAUSEA AND NO VOMITING
HEADACHE, FULLNESS IN HEAD: NOT PRESENT
HEADACHE, FULLNESS IN HEAD: VERY MILD
HOW MANY STANDARD DRINKS CONTAINING ALCOHOL DO YOU HAVE ON A TYPICAL DAY: 0,1 OR 2
AUDITORY DISTURBANCES: NOT PRESENT
TACTILE DISTURBANCES: NOT PRESENT
TACTILE DISTURBANCES: NOT PRESENT
AUDIT-C TOTAL SCORE: 0
VISUAL DISTURBANCES: NOT PRESENT
NAUSEA AND VOMITING: NO NAUSEA AND NO VOMITING

## 2021-04-08 PROCEDURE — 10002800 HB RX 250 W HCPCS: Performed by: INTERNAL MEDICINE

## 2021-04-08 PROCEDURE — 10000002 HB ROOM CHARGE MED SURG

## 2021-04-08 PROCEDURE — 99232 SBSQ HOSP IP/OBS MODERATE 35: CPT | Performed by: HOSPITALIST

## 2021-04-08 PROCEDURE — 10004651 HB RX, NO CHARGE ITEM: Performed by: INTERNAL MEDICINE

## 2021-04-08 PROCEDURE — 10002803 HB RX 637: Performed by: INTERNAL MEDICINE

## 2021-04-08 PROCEDURE — 10002800 HB RX 250 W HCPCS: Performed by: HOSPITALIST

## 2021-04-08 RX ADMIN — HYDROCODONE BITARTRATE AND ACETAMINOPHEN 1 TABLET: 5; 325 TABLET ORAL at 20:03

## 2021-04-08 RX ADMIN — MULTIPLE VITAMINS W/ MINERALS TAB 1 TABLET: TAB at 09:39

## 2021-04-08 RX ADMIN — GABAPENTIN 300 MG: 300 CAPSULE ORAL at 14:33

## 2021-04-08 RX ADMIN — BACLOFEN 5 MG: 10 TABLET ORAL at 09:39

## 2021-04-08 RX ADMIN — LORAZEPAM 0.5 MG: 2 INJECTION, SOLUTION INTRAMUSCULAR; INTRAVENOUS at 00:29

## 2021-04-08 RX ADMIN — SODIUM CHLORIDE, PRESERVATIVE FREE 2 ML: 5 INJECTION INTRAVENOUS at 21:00

## 2021-04-08 RX ADMIN — Medication 1 PATCH: at 09:39

## 2021-04-08 RX ADMIN — FLUOXETINE 40 MG: 20 CAPSULE ORAL at 09:39

## 2021-04-08 RX ADMIN — GABAPENTIN 300 MG: 300 CAPSULE ORAL at 09:39

## 2021-04-08 RX ADMIN — KETOROLAC TROMETHAMINE 15 MG: 15 INJECTION, SOLUTION INTRAMUSCULAR; INTRAVENOUS at 00:29

## 2021-04-08 RX ADMIN — HEPARIN SODIUM 5000 UNITS: 5000 INJECTION INTRAVENOUS; SUBCUTANEOUS at 14:33

## 2021-04-08 RX ADMIN — KETOROLAC TROMETHAMINE 15 MG: 15 INJECTION, SOLUTION INTRAMUSCULAR; INTRAVENOUS at 14:33

## 2021-04-08 RX ADMIN — SODIUM CHLORIDE, PRESERVATIVE FREE 2 ML: 5 INJECTION INTRAVENOUS at 09:43

## 2021-04-08 RX ADMIN — HEPARIN SODIUM 5000 UNITS: 5000 INJECTION INTRAVENOUS; SUBCUTANEOUS at 06:20

## 2021-04-08 RX ADMIN — HEPARIN SODIUM 5000 UNITS: 5000 INJECTION INTRAVENOUS; SUBCUTANEOUS at 21:00

## 2021-04-08 RX ADMIN — GABAPENTIN 300 MG: 300 CAPSULE ORAL at 20:03

## 2021-04-08 RX ADMIN — KETOROLAC TROMETHAMINE 15 MG: 15 INJECTION, SOLUTION INTRAMUSCULAR; INTRAVENOUS at 06:20

## 2021-04-08 ASSESSMENT — LIFESTYLE VARIABLES
TACTILE DISTURBANCES: NOT PRESENT
VISUAL DISTURBANCES: NOT PRESENT
AUDITORY DISTURBANCES: NOT PRESENT
TACTILE DISTURBANCES: NOT PRESENT
HEADACHE, FULLNESS IN HEAD: NOT PRESENT
TREMOR: NOT VISIBLE, BUT CAN BE FELT FINGERTIP TO FINGERTIP
AUDITORY DISTURBANCES: NOT PRESENT
PAROXYSMAL SWEATS: BARELY PERCEPTIBLE SWEATING, PALMS MOIST
AGITATION: NORMAL ACTIVITY
VISUAL DISTURBANCES: NOT PRESENT
TREMOR: NOT VISIBLE, BUT CAN BE FELT FINGERTIP TO FINGERTIP
HEADACHE, FULLNESS IN HEAD: NOT PRESENT
NAUSEA AND VOMITING: NO NAUSEA AND NO VOMITING
HEADACHE, FULLNESS IN HEAD: NOT PRESENT
TACTILE DISTURBANCES: NOT PRESENT
PAROXYSMAL SWEATS: BARELY PERCEPTIBLE SWEATING, PALMS MOIST
ANXIETY: NO ANXIETY, AT EASE
PAROXYSMAL SWEATS: BARELY PERCEPTIBLE SWEATING, PALMS MOIST
NAUSEA AND VOMITING: NO NAUSEA AND NO VOMITING
VISUAL DISTURBANCES: NOT PRESENT
AUDITORY DISTURBANCES: NOT PRESENT
TREMOR: NOT VISIBLE, BUT CAN BE FELT FINGERTIP TO FINGERTIP
NAUSEA AND VOMITING: NO NAUSEA AND NO VOMITING
AGITATION: NORMAL ACTIVITY
ANXIETY: MILDLY ANXIOUS
AGITATION: NORMAL ACTIVITY
ANXIETY: MILDLY ANXIOUS

## 2021-04-08 ASSESSMENT — PAIN SCALES - GENERAL
PAINLEVEL_OUTOF10: 6
PAINLEVEL_OUTOF10: 5
PAINLEVEL_OUTOF10: 6

## 2021-04-09 VITALS
OXYGEN SATURATION: 96 % | WEIGHT: 246.69 LBS | HEIGHT: 68 IN | TEMPERATURE: 97.3 F | BODY MASS INDEX: 37.39 KG/M2 | HEART RATE: 90 BPM | SYSTOLIC BLOOD PRESSURE: 133 MMHG | RESPIRATION RATE: 18 BRPM | DIASTOLIC BLOOD PRESSURE: 93 MMHG

## 2021-04-09 PROCEDURE — 99239 HOSP IP/OBS DSCHRG MGMT >30: CPT | Performed by: HOSPITALIST

## 2021-04-09 PROCEDURE — 10002800 HB RX 250 W HCPCS: Performed by: INTERNAL MEDICINE

## 2021-04-09 PROCEDURE — 10002803 HB RX 637: Performed by: INTERNAL MEDICINE

## 2021-04-09 RX ORDER — BACLOFEN 5 MG/1
5 TABLET ORAL DAILY PRN
Qty: 12 TABLET | Refills: 0 | Status: SHIPPED | OUTPATIENT
Start: 2021-04-09

## 2021-04-09 RX ORDER — GABAPENTIN 300 MG/1
300 CAPSULE ORAL 3 TIMES DAILY
Qty: 90 CAPSULE | Refills: 0 | Status: SHIPPED | OUTPATIENT
Start: 2021-04-09

## 2021-04-09 RX ORDER — BACLOFEN 5 MG/1
5 TABLET ORAL DAILY PRN
Qty: 12 TABLET | Refills: 0 | Status: SHIPPED | OUTPATIENT
Start: 2021-04-09 | End: 2021-04-09 | Stop reason: SDUPTHER

## 2021-04-09 RX ORDER — NICOTINE 21 MG/24HR
1 PATCH, TRANSDERMAL 24 HOURS TRANSDERMAL DAILY
Qty: 21 EACH | Refills: 0 | Status: SHIPPED | OUTPATIENT
Start: 2021-04-10

## 2021-04-09 RX ORDER — NICOTINE 21 MG/24HR
1 PATCH, TRANSDERMAL 24 HOURS TRANSDERMAL DAILY
Qty: 21 EACH | Refills: 0 | Status: SHIPPED | OUTPATIENT
Start: 2021-04-10 | End: 2021-04-09

## 2021-04-09 RX ORDER — FLUOXETINE HYDROCHLORIDE 40 MG/1
40 CAPSULE ORAL DAILY
Qty: 30 CAPSULE | Refills: 0 | Status: SHIPPED | OUTPATIENT
Start: 2021-04-09 | End: 2021-04-09 | Stop reason: SDUPTHER

## 2021-04-09 RX ORDER — TRAZODONE HYDROCHLORIDE 150 MG/1
150 TABLET ORAL NIGHTLY PRN
Qty: 12 TABLET | Refills: 0 | Status: SHIPPED | OUTPATIENT
Start: 2021-04-09

## 2021-04-09 RX ORDER — TRAZODONE HYDROCHLORIDE 150 MG/1
150 TABLET ORAL NIGHTLY PRN
Qty: 12 TABLET | Refills: 0 | Status: SHIPPED | OUTPATIENT
Start: 2021-04-09 | End: 2021-04-09 | Stop reason: SDUPTHER

## 2021-04-09 RX ORDER — GABAPENTIN 300 MG/1
300 CAPSULE ORAL 3 TIMES DAILY
Qty: 30 CAPSULE | Refills: 0 | Status: SHIPPED | OUTPATIENT
Start: 2021-04-09 | End: 2021-04-09 | Stop reason: SDUPTHER

## 2021-04-09 RX ORDER — FLUOXETINE HYDROCHLORIDE 40 MG/1
40 CAPSULE ORAL DAILY
Qty: 30 CAPSULE | Refills: 0 | Status: SHIPPED | OUTPATIENT
Start: 2021-04-09

## 2021-04-09 RX ADMIN — MULTIPLE VITAMINS W/ MINERALS TAB 1 TABLET: TAB at 09:18

## 2021-04-09 RX ADMIN — HYDROCODONE BITARTRATE AND ACETAMINOPHEN 1 TABLET: 5; 325 TABLET ORAL at 05:57

## 2021-04-09 RX ADMIN — FLUOXETINE 40 MG: 20 CAPSULE ORAL at 09:20

## 2021-04-09 RX ADMIN — GABAPENTIN 300 MG: 300 CAPSULE ORAL at 09:20

## 2021-04-09 RX ADMIN — Medication 1 PATCH: at 09:20

## 2021-04-09 RX ADMIN — BACLOFEN 5 MG: 10 TABLET ORAL at 09:22

## 2021-04-09 RX ADMIN — HEPARIN SODIUM 5000 UNITS: 5000 INJECTION INTRAVENOUS; SUBCUTANEOUS at 05:57

## 2021-04-09 ASSESSMENT — LIFESTYLE VARIABLES
TREMOR: NOT VISIBLE, BUT CAN BE FELT FINGERTIP TO FINGERTIP
HEADACHE, FULLNESS IN HEAD: NOT PRESENT
ANXIETY: MILDLY ANXIOUS
AUDITORY DISTURBANCES: NOT PRESENT
NAUSEA AND VOMITING: NO NAUSEA AND NO VOMITING
VISUAL DISTURBANCES: NOT PRESENT
AGITATION: SOMEWHAT MORE THAN NORMAL ACTIVITY
TACTILE DISTURBANCES: NOT PRESENT
PAROXYSMAL SWEATS: NO SWEAT VISIBLE

## 2021-04-09 ASSESSMENT — PAIN SCALES - GENERAL
PAINLEVEL_OUTOF10: 6
PAINLEVEL_OUTOF10: 0

## 2021-12-30 ENCOUNTER — HOSPITAL ENCOUNTER (EMERGENCY)
Age: 36
Discharge: HOME | End: 2021-12-30
Attending: EMERGENCY MEDICINE

## 2021-12-30 ENCOUNTER — APPOINTMENT (OUTPATIENT)
Dept: GENERAL RADIOLOGY | Age: 36
End: 2021-12-30
Attending: EMERGENCY MEDICINE

## 2021-12-30 ENCOUNTER — HOSPITAL ENCOUNTER (EMERGENCY)
Age: 36
End: 2021-12-30

## 2021-12-30 VITALS
OXYGEN SATURATION: 99 % | WEIGHT: 233.47 LBS | DIASTOLIC BLOOD PRESSURE: 92 MMHG | HEART RATE: 119 BPM | TEMPERATURE: 98.1 F | BODY MASS INDEX: 34.58 KG/M2 | HEIGHT: 69 IN | SYSTOLIC BLOOD PRESSURE: 135 MMHG | RESPIRATION RATE: 18 BRPM
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
## 2021-12-30 DIAGNOSIS — S02.85XA CLOSED FRACTURE OF ORBIT, INITIAL ENCOUNTER (CMD): ICD-10-CM

## 2021-12-30 DIAGNOSIS — S80.11XA CONTUSION OF RIGHT CALF, INITIAL ENCOUNTER: ICD-10-CM

## 2021-12-30 PROCEDURE — 10002803 HB RX 637: Performed by: EMERGENCY MEDICINE

## 2021-12-30 PROCEDURE — 73590 X-RAY EXAM OF LOWER LEG: CPT

## 2021-12-30 PROCEDURE — 99283 EMERGENCY DEPT VISIT LOW MDM: CPT

## 2021-12-30 PROCEDURE — 73562 X-RAY EXAM OF KNEE 3: CPT

## 2021-12-30 RX ORDER — HYDROCODONE BITARTRATE AND ACETAMINOPHEN 5; 325 MG/1; MG/1
1 TABLET ORAL ONCE
Status: COMPLETED | OUTPATIENT
Start: 2021-12-30 | End: 2021-12-30

## 2021-12-30 RX ADMIN — HYDROCODONE BITARTRATE AND ACETAMINOPHEN 1 TABLET: 5; 325 TABLET ORAL at 14:55

## 2022-06-06 NOTE — ED PROVIDER NOTES
Received signout. 49-year-old intoxicated, psychosis, anxiety. Cough syrup abuse. Previously medically cleared, pending placement to rehab. During my shift patient complaining of increased anxiety, given 1 dose of lorazepam, has been calm and stable.   Endorsed to Dr. Eleazar Guaman

## 2022-06-06 NOTE — ED NOTES
Patient denies psych hx, denies SI, HI. States he has a history of withdrawals from etoh and cough syrup. Patient is interested in rehab, preferably [facility]. States he was there in April.

## 2022-06-06 NOTE — ED QUICK NOTES
Gave report to [facility], [facility], [phone], states the director of [facility] wants to speak with patient on the phone, gave call back number, states she will call back

## 2022-06-06 NOTE — ED PROVIDER NOTES
Patient to be transferred directly to [facility] via ambulance for drug and alcohol rehabilitation.

## 2022-06-06 NOTE — CM/SW NOTE
Post discharge from ER, LYFT will be arranged for patient to go to:    [facility]  [address] with patient in ER WR and updated him on LYFT

## 2022-06-06 NOTE — ED QUICK NOTES
Poison controlled called. Talked to [facility]. Case nr. [id number]. Asked to order CK in addition to ordered labs. Said that cough syrup peak time is passed. Call with lab results. Encounter for palliative care

## 2022-06-06 NOTE — ED INITIAL ASSESSMENT (HPI)
Patient is here for hearing voices. Patient cannot hear his own thoughts per EMS. Patient had 2 beers. Patient states having psych history.

## 2022-06-06 NOTE — ED NOTES
Writer checked on pt. Pt is lying back on bed with eyes closed.      Writer will check on pt a little later